# Patient Record
Sex: MALE | Race: OTHER | NOT HISPANIC OR LATINO
[De-identification: names, ages, dates, MRNs, and addresses within clinical notes are randomized per-mention and may not be internally consistent; named-entity substitution may affect disease eponyms.]

---

## 2021-11-01 ENCOUNTER — APPOINTMENT (OUTPATIENT)
Dept: NEPHROLOGY | Facility: CLINIC | Age: 66
End: 2021-11-01
Payer: MEDICAID

## 2021-11-01 VITALS — SYSTOLIC BLOOD PRESSURE: 130 MMHG | DIASTOLIC BLOOD PRESSURE: 80 MMHG | HEART RATE: 82 BPM

## 2021-11-01 VITALS — HEART RATE: 84 BPM

## 2021-11-01 PROCEDURE — 99205 OFFICE O/P NEW HI 60 MIN: CPT

## 2021-11-01 NOTE — HISTORY OF PRESENT ILLNESS
[FreeTextEntry1] : The patient is a 67 year old male presenting for initial evaluation of renal insufficiency s/p hospitalization for CAD August 2021.\par \par Chief Complaint: Pt was hospitalized in August 2021 in [place], [place] for acute chest pain for which he had catheterization. He was then sent to [facility], where it was noted that his bladder had been punctured and he was leaking copious blood and urine, which was treated with prolonged nephrostomy. His course at [facility] was most notable for sepsis with septic shock, his presentation with acute MI, requirement for intubation, and approx one week of hemodialysis. He was then transferred to [facility] and was discharged last Thursday. He reports he has a small left kidney stone and a large one on the right. His wife reports he had a fever last night of almost 102, and his wife reports that his urine grew both e.coli and Citrobacter. He is here now for second opinion regarding next steps in therapy. \par \par Pt has discussed above with Dr. Corral, who advised him to have nephrostomy replaced with internal ureteral stent and then removed. He states that he would not approach either stone until full stabilization no earlier than February. He is tentatively scheduled for a procedure with Dr. Corral tomorrow, and had an appt at the end of the week to see a cardiologist.\par \par Past Medical History:\par - denies h/o HTN, DMII\par - kidney stones since 15 years ago, which was treated with allopurinol\par - CAD\par - sleep apnea\par - AKD\par \par Past Surgical History/Hospitalizations:\par - prostate surgery\par - kidney stone removal\par - cardiac catheterization and angioplasty with stents\par \par Family History:\par - no relevant FMH\par \par Social History:\par - jeweler\par - , 11 children\par - never smoker, no Etoh use\par - no recent travels\par \par Labs \par - 10/19/21: Fe 21, iron sat 16%, glucose 137, RBC 3.02, HGB 9.0, HCT 28.7%, creatinine 1.13, eGFR >60\par - 10/16/21: glucose 104, Cl 109, creatinine 1.16\par Current Medications: ASA 81mg QD, ferrous sulfate 325mg BID, Mg 64 QD, melatonin 3mg QPM< tamsulosin Hcl 0.4mg BID, atorvastatin 80mg QD, clopidorel 75mg QD, furosemide 40mg QD, metoprolol 25mg QD, KCl 10meq QD, finasteride 5mg QD. allopurinol 100mg QD, Vitamin D 50,000u QIW\par \par Allergies: NKDA

## 2021-11-01 NOTE — ASSESSMENT
[FreeTextEntry1] : Plan:\par 1) BP acceptable today in office.\par 2) Recent acute MI evaluated with angiography and treated with angioplasty and stents. Complications in cluded bladder perforation and requirement for prolonged bladder healing with a right nephrostomy in place. Dr. Corral planning to place internal ureteral stent and remove nephrostomy tomorrow, but I have advised pt that he need ot have a cardiologist see him this afternoon in order to ensure that he is stable and the necessary precautions will be employed for the procedure. Dr. James was contacted to discuss pt. Will refer pt to Dr. James to have full cardiac workup and coordinate further treatment with Dr. Corral. Pt will see Dr. James today.\par 3) Long term health will require an approach to reducing his morbid obesity.\par 4) Will hope to evaluate stone issues after acute problems had resolved.\par \par No changes to medications. \par \par Patient will return in December for a follow-up appointment.

## 2021-11-01 NOTE — ADDENDUM
[FreeTextEntry1] : All medical record entries made by the Scribe were at my, OMER Das's direction and personally dictated by me on 11/01/2021. I have received the chart and agree that the record accurately reflects my personal performance of the history, physical exam, assessment, and plan. I have also personally directed, reviewed, and agreed with the chart.\par

## 2021-11-01 NOTE — PHYSICAL EXAM
[General Appearance - Alert] : alert [General Appearance - In No Acute Distress] : in no acute distress [Sclera] : the sclera and conjunctiva were normal [PERRL With Normal Accommodation] : pupils were equal in size, round, and reactive to light [Extraocular Movements] : extraocular movements were intact [Outer Ear] : the ears and nose were normal in appearance [Hearing Threshold Finger Rub Not [place]] : hearing was normal [Examination Of The Oral Cavity] : the lips and gums were normal [Neck Appearance] : the appearance of the neck was normal [Neck Cervical Mass (___cm)] : no neck mass was observed [Jugular Venous Distention Increased] : there was no jugular-venous distention [Thyroid Diffuse Enlargement] : the thyroid was not enlarged [Thyroid Nodule] : there were no palpable thyroid nodules [Auscultation Breath Sounds / Voice Sounds] : lungs were clear to auscultation bilaterally [Heart Rate And Rhythm] : heart rate was normal and rhythm regular [Heart Sounds] : normal S1 and S2 [Heart Sounds Gallop] : no gallops [Murmurs] : no murmurs [Heart Sounds Pericardial Friction Rub] : no pericardial rub [Edema] : there was no peripheral edema [Bowel Sounds] : normal bowel sounds [Abdomen Soft] : soft [Abdomen Tenderness] : non-tender [Abdomen Mass (___ Cm)] : no abdominal mass palpated [No CVA Tenderness] : no ~M costovertebral angle tenderness [No Spinal Tenderness] : no spinal tenderness [Abnormal Walk] : normal gait [Involuntary Movements] : no involuntary movements were seen [Musculoskeletal - Swelling] : no joint swelling seen [Motor Tone] : muscle strength and tone were normal [Skin Color & Pigmentation] : normal skin color and pigmentation [Skin Turgor] : normal skin turgor [] : no rash [No Focal Deficits] : no focal deficits [Oriented To Time, Place, And Person] : oriented to person, place, and time [Impaired Insight] : insight and judgment were intact [Affect] : the affect was normal

## 2021-11-01 NOTE — END OF VISIT
[Time Spent: ___ minutes] : I have spent [unfilled] minutes of time on the encounter. [FreeTextEntry3] : Documented by Love Batista acting as a scribe for OMER Das on 11/01/2021.\par

## 2021-12-01 ENCOUNTER — LABORATORY RESULT (OUTPATIENT)
Age: 66
End: 2021-12-01

## 2021-12-01 ENCOUNTER — APPOINTMENT (OUTPATIENT)
Dept: NEPHROLOGY | Facility: CLINIC | Age: 66
End: 2021-12-01
Payer: MEDICAID

## 2021-12-01 VITALS — TEMPERATURE: 97.6 F | OXYGEN SATURATION: 98 % | HEART RATE: 82 BPM | WEIGHT: 247 LBS

## 2021-12-01 VITALS — HEART RATE: 72 BPM | SYSTOLIC BLOOD PRESSURE: 135 MMHG | DIASTOLIC BLOOD PRESSURE: 80 MMHG

## 2021-12-01 VITALS — DIASTOLIC BLOOD PRESSURE: 81 MMHG | HEART RATE: 71 BPM | SYSTOLIC BLOOD PRESSURE: 136 MMHG

## 2021-12-01 PROCEDURE — 99214 OFFICE O/P EST MOD 30 MIN: CPT | Mod: 25

## 2021-12-01 NOTE — ADDENDUM
[FreeTextEntry1] : All medical record entries made by the Scribe were at my, OMER Das's direction and personally dictated by me on 12/01/2021. I have received the chart and agree that the record accurately reflects my personal performance of the history, physical exam, assessment, and plan. I have also personally directed, reviewed, and agreed with the chart.\par

## 2021-12-01 NOTE — HISTORY OF PRESENT ILLNESS
[FreeTextEntry1] : The patient is a 67 year old male presenting for follow-up evaluation of renal insufficiency with h/o kidney stones, CAD (s/p cardiac catheterization and angioplasty with stents), sleep apnea, and AKD, and h/o past COVID infection.\par \par The patient is feeling generally well today but reports bilateral pedal burning pain. He reports no pain with ambulation but reports pain at rest when standing. Pt reports difficulty continuing after walking 1-2 blocks, which he attributes to the current lack of arches in his feet. He denies any associated back pain. He has been anemic since three months ago and reports he previously received iron injections weekly at [facility]. He is relieved after removal of his nephrostomy and is scheduled for another procedure with Dr. Corral in February. The pt reports two episodes of visible hematuria this past week, without pain. PT has not yet received COVID-19 vaccine.\par - Reviewed most recent labs in detail with the patient today. \par \par Current Medications: ASA 81mg QD, ferrous sulfate 325mg BID, melatonin 3mg QPM, tamsulosin Hcl 0.4mg BID, atorvastatin 80mg QD, Clopidol 75mg QD, furosemide 20mg QD, metoprolol 25mg BID, KCl 10meq QD, finasteride 5mg QD, allopurinol 100mg QD, Vitamin D 50,000u QIW\par - off Mg 64 QD

## 2021-12-01 NOTE — ASSESSMENT
[FreeTextEntry1] : Plan:\par 1) BP acceptable today in office.\par 2) Pain / numbness in his foot: Referred to podiatrist Dr. Villalobos for further evaluation.\par 3) Obesity: Advised patient to limit caloric intake and increase exercise regimen, as tolerated and weather permitting, with goal of gradual reduction in weight. \par 4) H/o kidney stones: C/w allopurinol.\par 5) Low B12: Have administered 1,000 micrograms of B12 intramuscularly to the right deltoid without incident. \par 6) Anemia: Pt likely iron deficient. Will check iron saturation on labs today. May plan for pt to receive IV iron infusions.\par 7) Advised patient to receive a COVID vaccine as soon as possible, especially in view of persistent COVID risks, excepting if she has a history of severe allergy."\par 6) Referred to Dr. Perez for vascular evaluation of his legs.\par \par Changes to medications: Administered 1,000 units of B12.\par \par Labs were drawn and patient will return in 1 month for a follow-up appointment.

## 2021-12-01 NOTE — PHYSICAL EXAM
[General Appearance - Alert] : alert [General Appearance - In No Acute Distress] : in no acute distress [Sclera] : the sclera and conjunctiva were normal [PERRL With Normal Accommodation] : pupils were equal in size, round, and reactive to light [Extraocular Movements] : extraocular movements were intact [Outer Ear] : the ears and nose were normal in appearance [Hearing Threshold Finger Rub Not [place]] : hearing was normal [Neck Appearance] : the appearance of the neck was normal [Neck Cervical Mass (___cm)] : no neck mass was observed [Jugular Venous Distention Increased] : there was no jugular-venous distention [Thyroid Diffuse Enlargement] : the thyroid was not enlarged [Thyroid Nodule] : there were no palpable thyroid nodules [Auscultation Breath Sounds / Voice Sounds] : lungs were clear to auscultation bilaterally [Heart Rate And Rhythm] : heart rate was normal and rhythm regular [Heart Sounds] : normal S1 and S2 [Heart Sounds Gallop] : no gallops [Murmurs] : no murmurs [Heart Sounds Pericardial Friction Rub] : no pericardial rub [Bowel Sounds] : normal bowel sounds [Abdomen Soft] : soft [Abdomen Tenderness] : non-tender [Abdomen Mass (___ Cm)] : no abdominal mass palpated [No CVA Tenderness] : no ~M costovertebral angle tenderness [No Spinal Tenderness] : no spinal tenderness [Abnormal Walk] : normal gait [Involuntary Movements] : no involuntary movements were seen [Musculoskeletal - Swelling] : no joint swelling seen [Motor Tone] : muscle strength and tone were normal [Skin Color & Pigmentation] : normal skin color and pigmentation [Skin Turgor] : normal skin turgor [] : no rash [No Focal Deficits] : no focal deficits [Oriented To Time, Place, And Person] : oriented to person, place, and time [Impaired Insight] : insight and judgment were intact [Affect] : the affect was normal [FreeTextEntry1] : 1-2+ pitting bilateral LE edema

## 2021-12-02 ENCOUNTER — APPOINTMENT (OUTPATIENT)
Dept: NEPHROLOGY | Facility: CLINIC | Age: 66
End: 2021-12-02

## 2021-12-02 LAB
24R-OH-CALCIDIOL SERPL-MCNC: 42.3 PG/ML
25(OH)D3 SERPL-MCNC: 22.7 NG/ML
ALBUMIN SERPL ELPH-MCNC: 4.6 G/DL
ALDOSTERONE SERUM: 8.2 NG/DL
ALP BLD-CCNC: 82 U/L
ALT SERPL-CCNC: 12 U/L
ANION GAP SERPL CALC-SCNC: 14 MMOL/L
APPEARANCE: ABNORMAL
AST SERPL-CCNC: 14 U/L
BACTERIA: ABNORMAL
BASOPHILS # BLD AUTO: 0.03 K/UL
BASOPHILS NFR BLD AUTO: 0.5 %
BILIRUB SERPL-MCNC: 0.3 MG/DL
BILIRUBIN URINE: NEGATIVE
BLOOD URINE: ABNORMAL
BUN SERPL-MCNC: 17 MG/DL
C3 SERPL-MCNC: 136 MG/DL
C4 SERPL-MCNC: 29 MG/DL
CALCIUM SERPL-MCNC: 9.6 MG/DL
CALCIUM SERPL-MCNC: 9.6 MG/DL
CHLORIDE SERPL-SCNC: 105 MMOL/L
CHOLEST SERPL-MCNC: 117 MG/DL
CO2 SERPL-SCNC: 22 MMOL/L
COLOR: ABNORMAL
COVID-19 NUCLEOCAPSID  GAM ANTIBODY INTERPRETATION: POSITIVE
COVID-19 SPIKE DOMAIN ANTIBODY INTERPRETATION: POSITIVE
CREAT SERPL-MCNC: 1.34 MG/DL
CRP SERPL-MCNC: <3 MG/L
EOSINOPHIL # BLD AUTO: 0.22 K/UL
EOSINOPHIL NFR BLD AUTO: 3.4 %
ERYTHROCYTE [SEDIMENTATION RATE] IN BLOOD BY WESTERGREN METHOD: 27 MM/HR
ESTIMATED AVERAGE GLUCOSE: 103 MG/DL
FERRITIN SERPL-MCNC: 146 NG/ML
FOLATE SERPL-MCNC: >20 NG/ML
GLUCOSE QUALITATIVE U: NEGATIVE
GLUCOSE SERPL-MCNC: 100 MG/DL
HBA1C MFR BLD HPLC: 5.2 %
HBV SURFACE AB SER QL: NONREACTIVE
HBV SURFACE AG SER QL: NONREACTIVE
HCT VFR BLD CALC: 35.5 %
HCV AB SER QL: NONREACTIVE
HCV S/CO RATIO: 0.18 S/CO
HCYS SERPL-MCNC: 14.7 UMOL/L
HDLC SERPL-MCNC: 41 MG/DL
HGB BLD-MCNC: 11.1 G/DL
HYALINE CASTS: 0 /LPF
IMM GRANULOCYTES NFR BLD AUTO: 0.3 %
IRON SATN MFR SERPL: 23 %
IRON SERPL-MCNC: 69 UG/DL
KETONES URINE: NEGATIVE
LDLC SERPL CALC-MCNC: 42 MG/DL
LEUKOCYTE ESTERASE URINE: ABNORMAL
LYMPHOCYTES # BLD AUTO: 1.63 K/UL
LYMPHOCYTES NFR BLD AUTO: 25.1 %
MAGNESIUM SERPL-MCNC: 2.1 MG/DL
MAN DIFF?: NORMAL
MCHC RBC-ENTMCNC: 29 PG
MCHC RBC-ENTMCNC: 31.3 GM/DL
MCV RBC AUTO: 92.7 FL
MICROSCOPIC-UA: NORMAL
MONOCYTES # BLD AUTO: 0.41 K/UL
MONOCYTES NFR BLD AUTO: 6.3 %
NEUTROPHILS # BLD AUTO: 4.19 K/UL
NEUTROPHILS NFR BLD AUTO: 64.4 %
NITRITE URINE: POSITIVE
NONHDLC SERPL-MCNC: 75 MG/DL
PARATHYROID HORMONE INTACT: 45 PG/ML
PH URINE: 8.5
PHOSPHATE SERPL-MCNC: 4.1 MG/DL
PLATELET # BLD AUTO: 162 K/UL
POTASSIUM SERPL-SCNC: 4.1 MMOL/L
PROT SERPL-MCNC: 7.4 G/DL
PROTEIN URINE: ABNORMAL
RBC # BLD: 3.83 M/UL
RBC # FLD: 14 %
RED BLOOD CELLS URINE: 18 /HPF
RENIN PLASMA: 11.7 PG/ML
RHEUMATOID FACT SER QL: <10 IU/ML
SARS-COV-2 AB SERPL IA-ACNC: >250 U/ML
SARS-COV-2 AB SERPL QL IA: 52 INDEX
SODIUM SERPL-SCNC: 142 MMOL/L
SPECIFIC GRAVITY URINE: 1.01
SQUAMOUS EPITHELIAL CELLS: 1 /HPF
T3FREE SERPL-MCNC: 3.61 PG/ML
T3RU NFR SERPL: 1.1 TBI
T4 FREE SERPL-MCNC: 1 NG/DL
T4 SERPL-MCNC: 5.6 UG/DL
THYROGLOB AB SERPL-ACNC: 39.4 IU/ML
THYROPEROXIDASE AB SERPL IA-ACNC: 24.6 IU/ML
TIBC SERPL-MCNC: 299 UG/DL
TRIGL SERPL-MCNC: 165 MG/DL
TSH SERPL-ACNC: 4.73 UIU/ML
UIBC SERPL-MCNC: 230 UG/DL
URATE SERPL-MCNC: 6.9 MG/DL
UROBILINOGEN URINE: NORMAL
VIT B12 SERPL-MCNC: >2000 PG/ML
WBC # FLD AUTO: 6.5 K/UL
WHITE BLOOD CELLS URINE: 351 /HPF

## 2021-12-03 LAB
ANA SER IF-ACNC: NEGATIVE
CREAT SPEC-SCNC: 81 MG/DL
CREAT/PROT UR: 0.8 RATIO
PROT UR-MCNC: 64 MG/DL

## 2021-12-05 LAB
ALBUMIN MFR SERPL ELPH: 55.6 %
ALBUMIN SERPL-MCNC: 4.1 G/DL
ALBUMIN/GLOB SERPL: 1.2 RATIO
ALP BONE SERPL-MCNC: 10.1 UG/L
ALPHA1 GLOB MFR SERPL ELPH: 4.1 %
ALPHA1 GLOB SERPL ELPH-MCNC: 0.3 G/DL
ALPHA2 GLOB MFR SERPL ELPH: 10.3 %
ALPHA2 GLOB SERPL ELPH-MCNC: 0.8 G/DL
B-GLOBULIN MFR SERPL ELPH: 10.2 %
B-GLOBULIN SERPL ELPH-MCNC: 0.8 G/DL
BACTERIA UR CULT: ABNORMAL
DEPRECATED KAPPA LC FREE/LAMBDA SER: 1.14 RATIO
GAMMA GLOB FLD ELPH-MCNC: 1.5 G/DL
GAMMA GLOB MFR SERPL ELPH: 19.8 %
IGA SER QL IEP: 91 MG/DL
IGG SER QL IEP: 1525 MG/DL
IGM SER QL IEP: 125 MG/DL
INTERPRETATION SERPL IEP-IMP: NORMAL
KAPPA LC CSF-MCNC: 2.2 MG/DL
KAPPA LC SERPL-MCNC: 2.5 MG/DL
M PROTEIN SPEC IFE-MCNC: NORMAL
MPO AB + PR3 PNL SER: NORMAL
PROT SERPL-MCNC: 7.4 G/DL
PROT SERPL-MCNC: 7.4 G/DL

## 2021-12-06 LAB — COLLAGEN CTX SERPL-MCNC: 824 PG/ML

## 2021-12-08 ENCOUNTER — APPOINTMENT (OUTPATIENT)
Dept: NEPHROLOGY | Facility: CLINIC | Age: 66
End: 2021-12-08
Payer: MEDICAID

## 2021-12-08 DIAGNOSIS — N39.0 URINARY TRACT INFECTION, SITE NOT SPECIFIED: ICD-10-CM

## 2021-12-08 DIAGNOSIS — M79.671 PAIN IN RIGHT FOOT: ICD-10-CM

## 2021-12-08 DIAGNOSIS — M79.672 PAIN IN RIGHT FOOT: ICD-10-CM

## 2021-12-08 PROCEDURE — 99443: CPT

## 2021-12-08 NOTE — ADDENDUM
[FreeTextEntry1] : All medical record entries made by the Scribe were at my, OMER Das's direction and personally dictated by me on 12/08/2021. I have received the chart and agree that the record accurately reflects my personal performance of the history, physical exam, assessment, and plan. I have also personally directed, reviewed, and agreed with the chart.\par

## 2021-12-08 NOTE — ASSESSMENT
[FreeTextEntry1] : Plan:\par 1) Visible hematuria: Pt will be seeing Dr. Corral 01/04/21. Pt has proteus mirabilis-esbl but is asymptomatic and will therefore not be treated for it at this time. Pt to call if he becomes symptomatic.\par 2) Pain / numbness in his feet: Rx for gabapentin 100mg QPM. May need to see a neurologist for further evaluation.\par 3) Obesity: Advised patient to limit caloric intake and increase exercise regimen, as tolerated and weather permitting, with goal of gradual reduction in weight. \par 4) H/o kidney stones: C/w allopurinol.\par 5) Referred to Dr. Maribell Bell for cardiology evaluation.\par \par Changes to medication: Will start gabapentin 100mg QD.\par \par Plan to reconvene with patient in 2-4 weeks via telemedicine or in office. \par

## 2021-12-08 NOTE — HISTORY OF PRESENT ILLNESS
[Home] : at home, [unfilled] , at the time of the visit. [Medical Office: ([facility])___] : at the medical office located in  [Verbal consent obtained from patient] : the patient, [unfilled] [FreeTextEntry1] : The patient is a 67 year old male presenting for follow-up evaluation of renal insufficiency with h/o kidney stones, CAD (s/p cardiac catheterization and angioplasty with stents), sleep apnea, and AKD, and h/o past COVID infection.\par \par The ft is feeling generally well. He is attending physical therapy. He continues to c/o visible hematuria. The pt has appointment with Dr Corral 01/04/22. He denies any LUTS from proteus mirabilis, and denies fevers/chills. Pt continues to c/o bilateral foot pain.\par - Reviewed most recent labs in detail with the patient today. \par \par Labs 12/01/21: hgb 11.1, Vit D 25-oh 22.7, TSH 4.73, glucose 100, creatinine 1.34, eGFR 41, sed rate 27, protein/creat ratio 0.8, positive COVID spike domain antibody with nucleocapsid\par - he has proteus mirabilis-esbl \par \par Current Medications: ASA 81mg QD, ferrous sulfate 325mg BID, melatonin 3mg QPM, tamsulosin Hcl 0.4mg BID, atorvastatin 80mg QD, Clopidol 75mg QD, furosemide 20mg QD, metoprolol 25mg BID, KCl 10meq QD, finasteride 5mg QD, allopurinol 100mg QD, Vitamin D 3000 QD (just started)\par

## 2021-12-10 LAB — METHYLMALONATE SERPL-SCNC: 436 NMOL/L

## 2022-03-01 ENCOUNTER — APPOINTMENT (OUTPATIENT)
Dept: NEPHROLOGY | Facility: CLINIC | Age: 67
End: 2022-03-01

## 2022-05-05 ENCOUNTER — APPOINTMENT (OUTPATIENT)
Dept: NEPHROLOGY | Facility: CLINIC | Age: 67
End: 2022-05-05
Payer: MEDICARE

## 2022-05-05 PROCEDURE — 99214 OFFICE O/P EST MOD 30 MIN: CPT | Mod: 95

## 2022-05-05 PROCEDURE — 99215 OFFICE O/P EST HI 40 MIN: CPT | Mod: 95

## 2022-05-05 NOTE — PHYSICAL EXAM
[General Appearance - Alert] : alert [General Appearance - In No Acute Distress] : in no acute distress [Sclera] : the sclera and conjunctiva were normal [Outer Ear] : the ears and nose were normal in appearance [Neck Appearance] : the appearance of the neck was normal [Oriented To Time, Place, And Person] : oriented to person, place, and time [Impaired Insight] : insight and judgment were intact [Affect] : the affect was normal

## 2022-05-05 NOTE — HISTORY OF PRESENT ILLNESS
[Home] : at home, [unfilled] , at the time of the visit. [Medical Office: ([facility])___] : at the medical office located in  [Verbal consent obtained from patient] : the patient, [unfilled] [Spouse] : spouse [FreeTextEntry1] : The patient is a 68 year old male presenting for follow-up evaluation of renal insufficiency with h/o kidney stones, CAD (s/p cardiac catheterization and angioplasty with stents), sleep apnea, and AKD, and h/o past COVID infection. Last seen December 2021 via telehealth.\par \par The patient is feeling generally well today. He presents with his wife. He had labs drawn recently 4/25 and 4/27, which were reviewed. He reports he received first Procrit shot yesterday from hematologist and has received three IV iron infusions. He c/o some constipation but denies issues voiding. Patient's wife reports that patient has been weak and is fatigued with walking, with elevated pulse . He denies any palpitations. He has had recent evaluation and echo with Dr. James. Wife reports that labs yesterday showed WBC of >14k though denies fever.\par \par Labs \par - 04/20/22: Fe 18, iron sat 11%, ferritin 1135, WBC 11.2, RBC 2.65, HGB 7.6, HCT 22.8%, PTH <16\par - 04/13/22: RBC 2.7, HGB 7.9, HCT 23.9%, eGFR 59, HDL 23, Fe 21, iron sat 12%, TSH 4.9, T3 66.3, U WBC 6-10, moderate calcium oxalate crystals, Vit B12 936, CO2 18, creatinine 1.3, HgbA1c 5.7%\par \par 04/29/22 US Male Pelvis: The urinary bladder is distended to a prevoid volume of 53mL with no postvoid residual volume. No obvious gross adenopathy appreciated.\par \par 04/29/22 US Abdomen:\par Impression: Complex fluid collection seen abutting the right kidney with some mass-effect on the cortex with questionable stents in the region of the hilum correlate with recent intervention history. Further work-up advised with CT abdomen and pelvis with and without intravenous contrast.\par \par Current Medications: ASA 81mg QD, ferrous sulfate 325mg BID, melatonin 3mg QPM, tamsulosin Hcl 0.4mg BID, atorvastatin 80mg QD, Clopidol 75mg QD, furosemide 20mg QD, metoprolol 25mg BID, KCl 10meq QD, finasteride 5mg QD, allopurinol 100mg QD, Vitamin D 3000 QD, gabapentin 100mg QD\par  fair minus

## 2022-05-05 NOTE — ADDENDUM
[FreeTextEntry1] : All medical record entries made by the Scribe were at my, OMER Das's direction and personally dictated by me on 05/05/2022. I have received the chart and agree that the record accurately reflects my personal performance of the history, physical exam, assessment, and plan. I have also personally directed, reviewed, and agreed with the chart.

## 2022-05-05 NOTE — END OF VISIT
[Time Spent: ___ minutes] : I have spent [unfilled] minutes of time on the encounter. [FreeTextEntry3] : Documented by Love Batista acting as a scribe for OMER Das on 05/05/2022.

## 2022-05-05 NOTE — ASSESSMENT
[FreeTextEntry1] : Plan:\par 1) Reviewed most recent labs with patient. \par 2) Patient will f/u with Dr. Corral and have reevaluation for his reported WBC of >14k and paranephric collections seen on CT. Discussed concerns about use of contrast in imaging (prior eGFR ~40) and discussed use of sonography, gallium scans, and direct fluid analysis by interventional radiology if those options are thought to be helpful by his current caregivers. \par 3) Patient to continue receiving Procrit shots with hematologist. Explained to patient and wife that results are not immediate and he is to continue treatment.\par 4) H/o kidney stones: C/w allopurinol.\par 5) Constipation: Advised to take Miralax.\par 6) Patient to request recent cardiac records from Dr. James and from Dr. Cheema at [facility] to be sent to my office for review.\par \par Plan to reconvene with patient in office in June for a follow-up appointment.

## 2022-05-10 ENCOUNTER — LABORATORY RESULT (OUTPATIENT)
Age: 67
End: 2022-05-10

## 2022-05-10 ENCOUNTER — APPOINTMENT (OUTPATIENT)
Dept: NEPHROLOGY | Facility: CLINIC | Age: 67
End: 2022-05-10
Payer: MEDICARE

## 2022-05-10 VITALS — HEART RATE: 94 BPM | DIASTOLIC BLOOD PRESSURE: 79 MMHG | SYSTOLIC BLOOD PRESSURE: 137 MMHG

## 2022-05-10 VITALS — DIASTOLIC BLOOD PRESSURE: 80 MMHG | HEART RATE: 100 BPM | SYSTOLIC BLOOD PRESSURE: 138 MMHG

## 2022-05-10 VITALS — WEIGHT: 218.26 LBS

## 2022-05-10 VITALS — HEART RATE: 113 BPM | SYSTOLIC BLOOD PRESSURE: 133 MMHG | DIASTOLIC BLOOD PRESSURE: 76 MMHG

## 2022-05-10 DIAGNOSIS — Z86.2 PERSONAL HISTORY OF DISEASES OF THE BLOOD AND BLOOD-FORMING ORGANS AND CERTAIN DISORDERS INVOLVING THE IMMUNE MECHANISM: ICD-10-CM

## 2022-05-10 PROCEDURE — 93000 ELECTROCARDIOGRAM COMPLETE: CPT

## 2022-05-10 PROCEDURE — 99214 OFFICE O/P EST MOD 30 MIN: CPT | Mod: 25

## 2022-05-10 NOTE — ADDENDUM
[FreeTextEntry1] : All medical record entries made by the Scribe were at my, Dr. Dc's, discretion and personally dictated by me on 05/10/2022. I have reviewed the chart and agree that the record accurately reflects my personal performance of the history, physical exam, assessment and plan. I have also personally directed, reviewed and agreed to the chart.

## 2022-05-10 NOTE — ASSESSMENT
[FreeTextEntry1] : Plan:\par 1) Low blood count: Patient to continue receiving iron infusions and Procrit shots with hematologist. Explained to patient and wife that results are not immediate.\par 2) H/o kidney stones: C/w allopurinol.\par 3) EKG done today demonstrates normal sinus rhythm, low voltage QRS cannot rule out septal infarct, age undetermined. \par 4) BLE edema,  tachycardia while standing, EKG findings: Will order CT of chest/abdomen/pelvis and informed patient he can do this in [place]. Advised patient to obtain a copy of the disc. \par 5) Fluid overload: Pt has BLE edema on furosemide 20mg. Will assess relevant labs on blood work today. Will order doppler US to check for bilateral DVT. Tentative plan to treat with torsemide to treat more aggressive diuresis. \par \par Plan to reconvene with patient in office 4-6 weeks for a follow-up appointment.

## 2022-05-10 NOTE — PHYSICAL EXAM
[General Appearance - Alert] : alert [General Appearance - In No Acute Distress] : in no acute distress [Sclera] : the sclera and conjunctiva were normal [PERRL With Normal Accommodation] : pupils were equal in size, round, and reactive to light [Extraocular Movements] : extraocular movements were intact [Outer Ear] : the ears and nose were normal in appearance [Examination Of The Oral Cavity] : the lips and gums were normal [Neck Appearance] : the appearance of the neck was normal [Neck Cervical Mass (___cm)] : no neck mass was observed [Jugular Venous Distention Increased] : there was no jugular-venous distention [Thyroid Diffuse Enlargement] : the thyroid was not enlarged [Thyroid Nodule] : there were no palpable thyroid nodules [Auscultation Breath Sounds / Voice Sounds] : lungs were clear to auscultation bilaterally [Heart Rate And Rhythm] : heart rate was normal and rhythm regular [Heart Sounds] : normal S1 and S2 [Heart Sounds Gallop] : no gallops [Murmurs] : no murmurs [Heart Sounds Pericardial Friction Rub] : no pericardial rub [No CVA Tenderness] : no ~M costovertebral angle tenderness [No Spinal Tenderness] : no spinal tenderness [Skin Color & Pigmentation] : normal skin color and pigmentation [Skin Turgor] : normal skin turgor [] : no rash [No Focal Deficits] : no focal deficits [Oriented To Time, Place, And Person] : oriented to person, place, and time [Impaired Insight] : insight and judgment were intact [Affect] : the affect was normal [FreeTextEntry1] : uses cane

## 2022-05-10 NOTE — HISTORY OF PRESENT ILLNESS
[FreeTextEntry1] : The patient is a 68 year old male presenting for follow-up evaluation of renal insufficiency with h/o kidney stones, CAD (s/p cardiac catheterization and angioplasty with stents), sleep apnea (uses CPAP), and AKD, and h/o past COVID infection 2020. \par \par The patient is feeling generally well today. Reports he recently saw Dr. Corral who evaluated paranephric collections and did urine tests. Patient reports his constipation has improved, on MiraLax. He has lost over 20 lbs in the last 5 months and his family reports he no longer drinks soda or eats chips. He does physical therapy 3x a week. Patient has been getting iron infusions and Procrit injections with a hematologist. Patient endorses foot pain and states he saw a neurologist and was told he has nerve damage. He denies h/o HTN, DM.\par \par Labs \par - 04/20/22: Fe 18, iron sat 11%, ferritin 1135, WBC 11.2, RBC 2.65, HGB 7.6, HCT 22.8%, PTH <16\par - 04/13/22: RBC 2.7, HGB 7.9, HCT 23.9%, eGFR 59, HDL 23, Fe 21, iron sat 12%, TSH 4.9, T3 66.3, U WBC 6-10, moderate calcium oxalate crystals, Vit B12 936, CO2 18, creatinine 1.3, HgbA1c 5.7%\par \par Current Medications: ASA 81mg QD, ferrous sulfate 325mg BID, melatonin 3mg QPM, tamsulosin Hcl 0.4mg BID, atorvastatin 80mg QD, Clopidol 75mg QD, furosemide 20mg QD, metoprolol 25mg BID, KCl 10meq QD, finasteride 5mg QD, allopurinol 100mg QD, Vitamin D 3000 QD, gabapentin 100mg QD, Miralax\par \par \par

## 2022-05-15 LAB
24R-OH-CALCIDIOL SERPL-MCNC: 37.6 PG/ML
25(OH)D3 SERPL-MCNC: 35.4 NG/ML
ALBUMIN SERPL ELPH-MCNC: 3.1 G/DL
ALDOSTERONE SERUM: 6.7 NG/DL
ALP BLD-CCNC: 85 U/L
ALT SERPL-CCNC: 8 U/L
ANA PAT FLD IF-IMP: ABNORMAL
ANA SER IF-ACNC: ABNORMAL
ANION GAP SERPL CALC-SCNC: 14 MMOL/L
APPEARANCE: ABNORMAL
AST SERPL-CCNC: 11 U/L
BACTERIA: ABNORMAL
BASOPHILS # BLD AUTO: 0.04 K/UL
BASOPHILS NFR BLD AUTO: 0.3 %
BILIRUB SERPL-MCNC: 0.4 MG/DL
BILIRUBIN URINE: NEGATIVE
BLOOD URINE: ABNORMAL
BUN SERPL-MCNC: 15 MG/DL
C3 SERPL-MCNC: 149 MG/DL
C4 SERPL-MCNC: 31 MG/DL
CALCIUM SERPL-MCNC: 9.2 MG/DL
CALCIUM SERPL-MCNC: 9.2 MG/DL
CH50 SERPL-MCNC: 54 U/ML
CHLORIDE SERPL-SCNC: 100 MMOL/L
CHOLEST SERPL-MCNC: 128 MG/DL
CO2 SERPL-SCNC: 23 MMOL/L
COLOR: YELLOW
CREAT SERPL-MCNC: 1.34 MG/DL
CREAT SPEC-SCNC: 140 MG/DL
CREAT/PROT UR: 0.5 RATIO
CRP SERPL-MCNC: 104 MG/L
CYSTATIN C SERPL-MCNC: 1.84 MG/L
DEPRECATED D DIMER PPP IA-ACNC: 978 NG/ML DDU
DEPRECATED KAPPA LC FREE/LAMBDA SER: 0.91 RATIO
DIRECT COOMBS: ABNORMAL
EGFR: 44 ML/MIN/1.73M2
EOSINOPHIL # BLD AUTO: 0.03 K/UL
EOSINOPHIL NFR BLD AUTO: 0.2 %
EPO SERPL-MCNC: 26.5 MIU/ML
ERYTHROCYTE [SEDIMENTATION RATE] IN BLOOD BY WESTERGREN METHOD: 52 MM/HR
ESTIMATED AVERAGE GLUCOSE: 108 MG/DL
FERRITIN SERPL-MCNC: 1390 NG/ML
FOLATE SERPL-MCNC: 9.9 NG/ML
FREE KAPPA URINE: 450.7 MG/L
FREE KAPPA/LAMDA RATIO: 5.13 RATIO
FREE LAMDA URINE: 87.84 MG/L
GFR/BSA.PRED SERPLBLD CYS-BASED-ARV: 32 ML/MIN/1.73M2
GLUCOSE QUALITATIVE U: NEGATIVE
GLUCOSE SERPL-MCNC: 98 MG/DL
HBA1C MFR BLD HPLC: 5.4 %
HBV SURFACE AB SER QL: NONREACTIVE
HBV SURFACE AG SER QL: NONREACTIVE
HCT VFR BLD CALC: 25.9 %
HCV AB SER QL: NONREACTIVE
HCV S/CO RATIO: 0.32 S/CO
HCYS SERPL-MCNC: 15.6 UMOL/L
HDLC SERPL-MCNC: 31 MG/DL
HGB A MFR BLD: 97.3 %
HGB A2 MFR BLD: 2.7 %
HGB BLD-MCNC: 7.7 G/DL
HGB FRACT BLD-IMP: NORMAL
HYALINE CASTS: 0 /LPF
IMM GRANULOCYTES NFR BLD AUTO: 0.9 %
IRON SATN MFR SERPL: 10 %
IRON SERPL-MCNC: 15 UG/DL
KAPPA LC CSF-MCNC: 13.07 MG/DL
KAPPA LC SERPL-MCNC: 11.85 MG/DL
KETONES URINE: NEGATIVE
LDLC SERPL CALC-MCNC: 75 MG/DL
LEUKOCYTE ESTERASE URINE: ABNORMAL
LYMPHOCYTES # BLD AUTO: 1.19 K/UL
LYMPHOCYTES NFR BLD AUTO: 9.2 %
MAGNESIUM SERPL-MCNC: 2.1 MG/DL
MAN DIFF?: NORMAL
MCHC RBC-ENTMCNC: 28.3 PG
MCHC RBC-ENTMCNC: 29.7 GM/DL
MCV RBC AUTO: 95.2 FL
MICROSCOPIC-UA: NORMAL
MONOCYTES # BLD AUTO: 0.61 K/UL
MONOCYTES NFR BLD AUTO: 4.7 %
NEUTROPHILS # BLD AUTO: 10.95 K/UL
NEUTROPHILS NFR BLD AUTO: 84.7 %
NITRITE URINE: NEGATIVE
NONHDLC SERPL-MCNC: 97 MG/DL
NT-PROBNP SERPL-MCNC: 830 PG/ML
PARATHYROID HORMONE INTACT: 11 PG/ML
PH URINE: 6.5
PHOSPHATE SERPL-MCNC: 4 MG/DL
PLATELET # BLD AUTO: 315 K/UL
POTASSIUM SERPL-SCNC: 4 MMOL/L
PROCALCITONIN SERPL-MCNC: 0.28 NG/ML
PROT SERPL-MCNC: 7.5 G/DL
PROT UR-MCNC: 64 MG/DL
PROTEIN URINE: ABNORMAL
RBC # BLD: 2.72 M/UL
RBC # BLD: 2.72 M/UL
RBC # FLD: 17.7 %
RED BLOOD CELLS URINE: 14 /HPF
RENIN PLASMA: 14.6 PG/ML
RETICS # AUTO: 2.5 %
RETICS AGGREG/RBC NFR: 68.1 K/UL
RHEUMATOID FACT SER QL: 16 IU/ML
SODIUM SERPL-SCNC: 136 MMOL/L
SPECIFIC GRAVITY URINE: 1.02
SQUAMOUS EPITHELIAL CELLS: 2 /HPF
T3FREE SERPL-MCNC: 1.99 PG/ML
T3RU NFR SERPL: 0.9 TBI
T4 FREE SERPL-MCNC: 1.1 NG/DL
T4 SERPL-MCNC: 5.6 UG/DL
THYROGLOB AB SERPL-ACNC: 21 IU/ML
THYROPEROXIDASE AB SERPL IA-ACNC: 31.2 IU/ML
TIBC SERPL-MCNC: 143 UG/DL
TM INTERPRETATION: NORMAL
TRIGL SERPL-MCNC: 111 MG/DL
TSH SERPL-ACNC: 4.44 UIU/ML
UIBC SERPL-MCNC: 128 UG/DL
URATE SERPL-MCNC: 5.3 MG/DL
UROBILINOGEN URINE: NORMAL
VIT B12 SERPL-MCNC: 1048 PG/ML
WBC # FLD AUTO: 12.94 K/UL
WHITE BLOOD CELLS URINE: 48 /HPF

## 2022-05-17 LAB
ALBUMIN MFR SERPL ELPH: 37.3 %
ALBUMIN SERPL-MCNC: 2.8 G/DL
ALBUMIN/GLOB SERPL: 0.6 RATIO
ALBUPE: 15.3 %
ALP BONE SERPL-MCNC: 11.6 UG/L
ALPHA1 GLOB MFR SERPL ELPH: 8.7 %
ALPHA1 GLOB SERPL ELPH-MCNC: 0.7 G/DL
ALPHA1UPE: 33.1 %
ALPHA2 GLOB MFR SERPL ELPH: 12.9 %
ALPHA2 GLOB SERPL ELPH-MCNC: 1 G/DL
ALPHA2UPE: 18.1 %
B-GLOBULIN MFR SERPL ELPH: 10.7 %
B-GLOBULIN SERPL ELPH-MCNC: 0.8 G/DL
BETAUPE: 17 %
COLLAGEN CTX SERPL-MCNC: 918 PG/ML
DEPRECATED KAPPA LC FREE/LAMBDA SER: 0.91 RATIO
GAMMA GLOB FLD ELPH-MCNC: 2.3 G/DL
GAMMA GLOB MFR SERPL ELPH: 30.4 %
GAMMAUPE: 16.5 %
IGA 24H UR QL IFE: NORMAL
IGA SER QL IEP: 170 MG/DL
IGG SER QL IEP: 2409 MG/DL
IGM SER QL IEP: 156 MG/DL
INTERPRETATION SERPL IEP-IMP: NORMAL
KAPPA LC 24H UR QL: NORMAL
KAPPA LC CSF-MCNC: 13.07 MG/DL
KAPPA LC SERPL-MCNC: 11.85 MG/DL
M PROTEIN MFR SERPL ELPH: NORMAL
M PROTEIN SPEC IFE-MCNC: NORMAL
MONOCLON BAND OBS SERPL: NORMAL
PROT PATTERN 24H UR ELPH-IMP: NORMAL
PROT SERPL-MCNC: 7.5 G/DL
PROT SERPL-MCNC: 7.5 G/DL
PROT UR-MCNC: 62 MG/DL
PROT UR-MCNC: 62 MG/DL

## 2022-05-22 LAB — METHYLMALONATE SERPL-SCNC: 269 NMOL/L

## 2022-06-12 LAB
C1Q IMMUNE COMPLEX: 94.8 UG EQ/ML
C1Q IMMUNE COMPLEX: >100 UG EQ/ML
C3D IMMUNE COMPLEXES: 19.7 UG EQ/ML

## 2022-07-11 ENCOUNTER — APPOINTMENT (OUTPATIENT)
Dept: NEPHROLOGY | Facility: CLINIC | Age: 67
End: 2022-07-11

## 2022-08-15 ENCOUNTER — LABORATORY RESULT (OUTPATIENT)
Age: 67
End: 2022-08-15

## 2022-08-15 ENCOUNTER — APPOINTMENT (OUTPATIENT)
Dept: NEPHROLOGY | Facility: CLINIC | Age: 67
End: 2022-08-15

## 2022-08-15 PROCEDURE — 36415 COLL VENOUS BLD VENIPUNCTURE: CPT

## 2022-08-15 PROCEDURE — 99214 OFFICE O/P EST MOD 30 MIN: CPT | Mod: 25

## 2022-08-16 LAB
25(OH)D3 SERPL-MCNC: 24.7 NG/ML
ALBUMIN SERPL ELPH-MCNC: 3.4 G/DL
ALP BLD-CCNC: 86 U/L
ALT SERPL-CCNC: 7 U/L
ANION GAP SERPL CALC-SCNC: 14 MMOL/L
APTT BLD: 37.1 SEC
AST SERPL-CCNC: 9 U/L
BASOPHILS # BLD AUTO: 0.04 K/UL
BASOPHILS NFR BLD AUTO: 0.5 %
BILIRUB SERPL-MCNC: 0.3 MG/DL
BUN SERPL-MCNC: 14 MG/DL
CALCIUM SERPL-MCNC: 9.1 MG/DL
CALCIUM SERPL-MCNC: 9.1 MG/DL
CHLORIDE SERPL-SCNC: 105 MMOL/L
CHOLEST SERPL-MCNC: 145 MG/DL
CO2 SERPL-SCNC: 19 MMOL/L
CORTIS SERPL-MCNC: 8 UG/DL
CREAT SERPL-MCNC: 1.21 MG/DL
CRP SERPL-MCNC: 12 MG/L
DEPRECATED D DIMER PPP IA-ACNC: 297 NG/ML DDU
EGFR: 66 ML/MIN/1.73M2
EOSINOPHIL # BLD AUTO: 0.08 K/UL
EOSINOPHIL NFR BLD AUTO: 1 %
ERYTHROCYTE [SEDIMENTATION RATE] IN BLOOD BY WESTERGREN METHOD: 64 MM/HR
ESTIMATED AVERAGE GLUCOSE: 97 MG/DL
FERRITIN SERPL-MCNC: 590 NG/ML
FOLATE SERPL-MCNC: 10.7 NG/ML
GLUCOSE SERPL-MCNC: 101 MG/DL
HBA1C MFR BLD HPLC: 5 %
HBV SURFACE AB SER QL: NONREACTIVE
HBV SURFACE AG SER QL: NONREACTIVE
HCT VFR BLD CALC: 37.4 %
HCV AB SER QL: NONREACTIVE
HCV S/CO RATIO: 0.16 S/CO
HCYS SERPL-MCNC: 17 UMOL/L
HDLC SERPL-MCNC: 50 MG/DL
HGB BLD-MCNC: 11.1 G/DL
IMM GRANULOCYTES NFR BLD AUTO: 0.5 %
INR PPP: 1.1 RATIO
IRON SATN MFR SERPL: 32 %
IRON SERPL-MCNC: 58 UG/DL
LDLC SERPL CALC-MCNC: 82 MG/DL
LYMPHOCYTES # BLD AUTO: 1.58 K/UL
LYMPHOCYTES NFR BLD AUTO: 18.9 %
MAGNESIUM SERPL-MCNC: 2 MG/DL
MAN DIFF?: NORMAL
MCHC RBC-ENTMCNC: 28.6 PG
MCHC RBC-ENTMCNC: 29.7 GM/DL
MCV RBC AUTO: 96.4 FL
MONOCYTES # BLD AUTO: 0.37 K/UL
MONOCYTES NFR BLD AUTO: 4.4 %
NEUTROPHILS # BLD AUTO: 6.24 K/UL
NEUTROPHILS NFR BLD AUTO: 74.7 %
NONHDLC SERPL-MCNC: 95 MG/DL
NT-PROBNP SERPL-MCNC: 327 PG/ML
PARATHYROID HORMONE INTACT: 30 PG/ML
PHOSPHATE SERPL-MCNC: 3.4 MG/DL
PLATELET # BLD AUTO: 293 K/UL
POTASSIUM SERPL-SCNC: 4.3 MMOL/L
PROCALCITONIN SERPL-MCNC: 0.09 NG/ML
PROT SERPL-MCNC: 7.5 G/DL
PT BLD: 12.8 SEC
RBC # BLD: 3.88 M/UL
RBC # FLD: 15.5 %
RHEUMATOID FACT SER QL: <10 IU/ML
SODIUM SERPL-SCNC: 138 MMOL/L
T3FREE SERPL-MCNC: 2.56 PG/ML
T3RU NFR SERPL: 0.9 TBI
T4 FREE SERPL-MCNC: 1.2 NG/DL
T4 SERPL-MCNC: 5.8 UG/DL
TIBC SERPL-MCNC: 180 UG/DL
TRIGL SERPL-MCNC: 69 MG/DL
TSH SERPL-ACNC: 4.08 UIU/ML
UIBC SERPL-MCNC: 122 UG/DL
URATE SERPL-MCNC: 6.2 MG/DL
VIT B12 SERPL-MCNC: 600 PG/ML
WBC # FLD AUTO: 8.35 K/UL

## 2022-08-22 ENCOUNTER — APPOINTMENT (OUTPATIENT)
Dept: NEPHROLOGY | Facility: CLINIC | Age: 67
End: 2022-08-22
Payer: MEDICARE

## 2022-08-22 LAB
24R-OH-CALCIDIOL SERPL-MCNC: 14.7 PG/ML
ALP BONE SERPL-MCNC: 12.3 UG/L
ANA PAT FLD IF-IMP: ABNORMAL
ANA SER IF-ACNC: ABNORMAL
APPEARANCE: ABNORMAL
BACTERIA UR CULT: ABNORMAL
BACTERIA: ABNORMAL
BETA CAROTENE: 7 UG/DL
BILIRUBIN URINE: NEGATIVE
BLOOD URINE: NEGATIVE
C1Q IMMUNE COMPLEX: 24.4 UG EQ/ML
C3 SERPL-MCNC: 119 MG/DL
C4 SERPL-MCNC: 26 MG/DL
CELIAC DISEASE INTERPRETATION: NORMAL
CELIAC GENE PAIRS PRESENT: YES
CELIACPAN: NORMAL
COLLAGEN CTX SERPL-MCNC: 1157 PG/ML
COLOR: NORMAL
DEPRECATED KAPPA LC FREE/LAMBDA SER: 1.22 RATIO
DQ ALPHA 1: NORMAL
DQ BETA 1: NORMAL
GLUCOSE QUALITATIVE U: NEGATIVE
HYALINE CASTS: 0 /LPF
IGA 24H UR QL IFE: NORMAL
IMMUNOGLOBULIN A (IGA): 163 MG/DL
KAPPA LC CSF-MCNC: 6.08 MG/DL
KAPPA LC SERPL-MCNC: 7.43 MG/DL
KETONES URINE: NEGATIVE
LEUKOCYTE ESTERASE URINE: ABNORMAL
MICROSCOPIC-UA: NORMAL
NITRITE URINE: POSITIVE
PH URINE: 6
PROTEIN URINE: NEGATIVE
RED BLOOD CELLS URINE: 1 /HPF
SPECIFIC GRAVITY URINE: 1.01
SQUAMOUS EPITHELIAL CELLS: 0 /HPF
THYROGLOB AB SERPL-ACNC: 23.7 IU/ML
THYROPEROXIDASE AB SERPL IA-ACNC: 51 IU/ML
UROBILINOGEN URINE: NORMAL
VIT A SERPL-MCNC: 25.8 UG/DL
VIT B1 SERPL-MCNC: 119.5 NMOL/L
VIT B2 SERPL-MCNC: 201 UG/L
VIT B6 SERPL-MCNC: 3.4 UG/L
WHITE BLOOD CELLS URINE: 67 /HPF

## 2022-08-22 PROCEDURE — 99214 OFFICE O/P EST MOD 30 MIN: CPT | Mod: 95

## 2022-08-29 LAB
ALBUMIN MFR SERPL ELPH: 43.2 %
ALBUMIN SERPL-MCNC: 3.2 G/DL
ALBUMIN/GLOB SERPL: 0.7 RATIO
ALPHA1 GLOB MFR SERPL ELPH: 6 %
ALPHA1 GLOB SERPL ELPH-MCNC: 0.4 G/DL
ALPHA2 GLOB MFR SERPL ELPH: 10.3 %
ALPHA2 GLOB SERPL ELPH-MCNC: 0.8 G/DL
ANNOTATION COMMENT IMP: NORMAL
B-GLOBULIN MFR SERPL ELPH: 9.5 %
B-GLOBULIN SERPL ELPH-MCNC: 0.7 G/DL
DEPRECATED KAPPA LC FREE/LAMBDA SER: 1.22 RATIO
GAMMA GLOB FLD ELPH-MCNC: 2.3 G/DL
GAMMA GLOB MFR SERPL ELPH: 31 %
HLA-DQ2: POSITIVE
HLA-DQ8 QL: NEGATIVE
IGA SER QL IEP: 172 MG/DL
IGG SER QL IEP: 2381 MG/DL
IGM SER QL IEP: 232 MG/DL
INTERPRETATION SERPL IEP-IMP: NORMAL
KAPPA LC CSF-MCNC: 6.08 MG/DL
KAPPA LC SERPL-MCNC: 7.43 MG/DL
M PROTEIN MFR SERPL ELPH: NORMAL
M PROTEIN SPEC IFE-MCNC: NORMAL
MENADIONE SERPL-MCNC: 0.57 NG/ML
MONOCLON BAND OBS SERPL: NORMAL
NICOTINAMIDE: 42.2 NG/ML
NICOTINIC ACID: <5 NG/ML
PROT SERPL-MCNC: 7.5 G/DL
PROT SERPL-MCNC: 7.5 G/DL
REF LAB TEST METHOD: NORMAL
VIT C SERPL-MCNC: 0.3 MG/DL

## 2022-11-28 ENCOUNTER — APPOINTMENT (OUTPATIENT)
Dept: NEPHROLOGY | Facility: CLINIC | Age: 67
End: 2022-11-28

## 2022-11-28 NOTE — HISTORY OF PRESENT ILLNESS
[FreeTextEntry1] : The patient is a 67 year male being seen for a follow up. He has a previous history of renal insufficiency with h/o kidney stones, CAD (s/p cardiac catheterization and angioplasty with stents), sleep apnea (uses CPAP), and AKD, and h/o past COVID infection 2020.  Last seen August 2022.\par \par Labs \par - 04/20/22: Fe 18, iron sat 11%, ferritin 1135, WBC 11.2, RBC 2.65, HGB 7.6, HCT 22.8%, PTH <16\par - 04/13/22: RBC 2.7, HGB 7.9, HCT 23.9%, eGFR 59, HDL 23, Fe 21, iron sat 12%, TSH 4.9, T3 66.3, U WBC 6-10, moderate calcium oxalate crystals, Vit B12 936, CO2 18, creatinine 1.3, HgbA1c 5.7%\par \par Current Medications: ASA 81mg QD, ferrous sulfate 325mg BID, melatonin 3mg QPM, tamsulosin Hcl 0.4mg BID, atorvastatin 80mg QD, Clopidol 75mg QD, furosemide 20mg QD, metoprolol 25mg BID, KCl 10meq QD, finasteride 5mg QD, allopurinol 100mg QD, Vitamin D 3000 QD, gabapentin 100mg QD, Miralax\par \par \par

## 2022-12-20 ENCOUNTER — LABORATORY RESULT (OUTPATIENT)
Age: 67
End: 2022-12-20

## 2022-12-20 ENCOUNTER — APPOINTMENT (OUTPATIENT)
Dept: NEPHROLOGY | Facility: CLINIC | Age: 67
End: 2022-12-20

## 2022-12-20 VITALS — WEIGHT: 204 LBS

## 2022-12-20 DIAGNOSIS — Z23 ENCOUNTER FOR IMMUNIZATION: ICD-10-CM

## 2022-12-20 DIAGNOSIS — R63.4 ABNORMAL WEIGHT LOSS: ICD-10-CM

## 2022-12-20 PROCEDURE — 90662 IIV NO PRSV INCREASED AG IM: CPT

## 2022-12-20 PROCEDURE — 99214 OFFICE O/P EST MOD 30 MIN: CPT | Mod: 25

## 2022-12-20 PROCEDURE — G0008: CPT

## 2023-01-01 LAB
ALBUMIN MFR SERPL ELPH: 55.9 %
ALBUMIN SERPL ELPH-MCNC: 4.6 G/DL
ALBUMIN SERPL-MCNC: 4.3 G/DL
ALBUMIN/GLOB SERPL: 1.3 RATIO
ALP BLD-CCNC: 109 U/L
ALPHA1 GLOB MFR SERPL ELPH: 4 %
ALPHA1 GLOB SERPL ELPH-MCNC: 0.3 G/DL
ALPHA2 GLOB MFR SERPL ELPH: 9.3 %
ALPHA2 GLOB SERPL ELPH-MCNC: 0.7 G/DL
ALT SERPL-CCNC: 11 U/L
ANA SER IF-ACNC: NEGATIVE
ANION GAP SERPL CALC-SCNC: 13 MMOL/L
APPEARANCE: ABNORMAL
AST SERPL-CCNC: 15 U/L
B-GLOBULIN MFR SERPL ELPH: 9 %
B-GLOBULIN SERPL ELPH-MCNC: 0.7 G/DL
BACTERIA: ABNORMAL
BASOPHILS # BLD AUTO: 0.03 K/UL
BASOPHILS NFR BLD AUTO: 0.5 %
BILIRUB SERPL-MCNC: 0.4 MG/DL
BILIRUBIN URINE: NEGATIVE
BLOOD URINE: NEGATIVE
BUN SERPL-MCNC: 17 MG/DL
C3 SERPL-MCNC: 120 MG/DL
C4 SERPL-MCNC: 30 MG/DL
CALCIUM SERPL-MCNC: 9.7 MG/DL
CHLORIDE SERPL-SCNC: 103 MMOL/L
CHOLEST SERPL-MCNC: 126 MG/DL
CO2 SERPL-SCNC: 22 MMOL/L
COLOR: NORMAL
COVID-19 NUCLEOCAPSID  GAM ANTIBODY INTERPRETATION: POSITIVE
COVID-19 SPIKE DOMAIN ANTIBODY INTERPRETATION: POSITIVE
CREAT SERPL-MCNC: 1.19 MG/DL
CREAT SPEC-SCNC: 62 MG/DL
CREAT/PROT UR: 0.2 RATIO
CRP SERPL-MCNC: 4 MG/L
CYSTATIN C SERPL-MCNC: 1.53 MG/L
DEPRECATED KAPPA LC FREE/LAMBDA SER: 1.36 RATIO
EGFR: 68 ML/MIN/1.73M2
EOSINOPHIL # BLD AUTO: 0.16 K/UL
EOSINOPHIL NFR BLD AUTO: 2.5 %
ERYTHROCYTE [SEDIMENTATION RATE] IN BLOOD BY WESTERGREN METHOD: 25 MM/HR
ESTIMATED AVERAGE GLUCOSE: 100 MG/DL
FERRITIN SERPL-MCNC: 387 NG/ML
FOLATE SERPL-MCNC: 19.5 NG/ML
GAMMA GLOB FLD ELPH-MCNC: 1.7 G/DL
GAMMA GLOB MFR SERPL ELPH: 21.8 %
GFR/BSA.PRED SERPLBLD CYS-BASED-ARV: 43 ML/MIN/1.73M2
GLUCOSE QUALITATIVE U: NEGATIVE
GLUCOSE SERPL-MCNC: 84 MG/DL
HBA1C MFR BLD HPLC: 5.1 %
HBV SURFACE AB SER QL: NONREACTIVE
HBV SURFACE AG SER QL: NONREACTIVE
HCT VFR BLD CALC: 39.5 %
HCV AB SER QL: NONREACTIVE
HCV S/CO RATIO: 0.12 S/CO
HCYS SERPL-MCNC: 13.8 UMOL/L
HDLC SERPL-MCNC: 49 MG/DL
HGB BLD-MCNC: 12.7 G/DL
HYALINE CASTS: 0 /LPF
IGA SER QL IEP: 111 MG/DL
IGG SER QL IEP: 1810 MG/DL
IGM SER QL IEP: 152 MG/DL
IMM GRANULOCYTES NFR BLD AUTO: 0.2 %
INTERPRETATION SERPL IEP-IMP: NORMAL
IRON SATN MFR SERPL: 26 %
IRON SERPL-MCNC: 72 UG/DL
KAPPA LC CSF-MCNC: 2.39 MG/DL
KAPPA LC SERPL-MCNC: 3.24 MG/DL
KETONES URINE: NEGATIVE
LDLC SERPL CALC-MCNC: 60 MG/DL
LEUKOCYTE ESTERASE URINE: ABNORMAL
LYMPHOCYTES # BLD AUTO: 1.5 K/UL
LYMPHOCYTES NFR BLD AUTO: 23.7 %
M PROTEIN MFR SERPL ELPH: 0 %
M PROTEIN SPEC IFE-MCNC: NORMAL
MAGNESIUM SERPL-MCNC: 2.1 MG/DL
MAN DIFF?: NORMAL
MCHC RBC-ENTMCNC: 30.5 PG
MCHC RBC-ENTMCNC: 32.2 GM/DL
MCV RBC AUTO: 95 FL
METHYLMALONATE SERPL-SCNC: 299 NMOL/L
MICROSCOPIC-UA: NORMAL
MONOCLON BAND OBS SERPL: 0 G/DL
MONOCYTES # BLD AUTO: 0.44 K/UL
MONOCYTES NFR BLD AUTO: 6.9 %
NEUTROPHILS # BLD AUTO: 4.2 K/UL
NEUTROPHILS NFR BLD AUTO: 66.2 %
NITRITE URINE: POSITIVE
NONHDLC SERPL-MCNC: 77 MG/DL
PH URINE: 6.5
PHOSPHATE SERPL-MCNC: 4 MG/DL
PLATELET # BLD AUTO: 155 K/UL
POTASSIUM SERPL-SCNC: 4.5 MMOL/L
PROT SERPL-MCNC: 7.7 G/DL
PROT UR-MCNC: 14 MG/DL
PROTEIN URINE: NORMAL
PSA FREE FLD-MCNC: 19 %
PSA FREE SERPL-MCNC: 0.32 NG/ML
PSA SERPL-MCNC: 1.64 NG/ML
RBC # BLD: 4.16 M/UL
RBC # FLD: 13.7 %
RED BLOOD CELLS URINE: 2 /HPF
RHEUMATOID FACT SER QL: <10 IU/ML
SARS-COV-2 AB SERPL IA-ACNC: >250 U/ML
SARS-COV-2 AB SERPL QL IA: 88.7 INDEX
SODIUM SERPL-SCNC: 138 MMOL/L
SPECIFIC GRAVITY URINE: 1.01
SQUAMOUS EPITHELIAL CELLS: 0 /HPF
T3FREE SERPL-MCNC: 2.84 PG/ML
T3RU NFR SERPL: 1 TBI
T4 FREE SERPL-MCNC: 1.1 NG/DL
T4 SERPL-MCNC: 6.2 UG/DL
TESTOST FREE SERPL-MCNC: 6.5 PG/ML
TESTOST SERPL-MCNC: 697 NG/DL
THYROGLOB AB SERPL-ACNC: 33.1 IU/ML
THYROPEROXIDASE AB SERPL IA-ACNC: 67.9 IU/ML
TIBC SERPL-MCNC: 282 UG/DL
TRIGL SERPL-MCNC: 87 MG/DL
TSH SERPL-ACNC: 3.38 UIU/ML
UIBC SERPL-MCNC: 210 UG/DL
URATE SERPL-MCNC: 4.1 MG/DL
UROBILINOGEN URINE: NORMAL
VIT B12 SERPL-MCNC: 426 PG/ML
WBC # FLD AUTO: 6.34 K/UL
WHITE BLOOD CELLS URINE: 127 /HPF

## 2023-04-03 ENCOUNTER — LABORATORY RESULT (OUTPATIENT)
Age: 68
End: 2023-04-03

## 2023-04-03 ENCOUNTER — APPOINTMENT (OUTPATIENT)
Dept: NEPHROLOGY | Facility: CLINIC | Age: 68
End: 2023-04-03
Payer: MEDICARE

## 2023-04-03 VITALS — WEIGHT: 216.05 LBS

## 2023-04-03 PROCEDURE — 99214 OFFICE O/P EST MOD 30 MIN: CPT | Mod: 25

## 2023-04-03 PROCEDURE — 36415 COLL VENOUS BLD VENIPUNCTURE: CPT

## 2023-04-03 RX ORDER — TADALAFIL 20 MG/1
20 TABLET ORAL
Qty: 8 | Refills: 6 | Status: ACTIVE | COMMUNITY
Start: 2022-12-20 | End: 1900-01-01

## 2023-04-05 LAB
24R-OH-CALCIDIOL SERPL-MCNC: 37.2 PG/ML
25(OH)D3 SERPL-MCNC: 23.9 NG/ML
ALBUMIN SERPL ELPH-MCNC: 4.7 G/DL
ALP BLD-CCNC: 95 U/L
ALT SERPL-CCNC: 9 U/L
ANA SER IF-ACNC: NEGATIVE
ANION GAP SERPL CALC-SCNC: 13 MMOL/L
APPEARANCE: ABNORMAL
AST SERPL-CCNC: 15 U/L
BACTERIA: ABNORMAL
BASOPHILS # BLD AUTO: 0.03 K/UL
BASOPHILS NFR BLD AUTO: 0.4 %
BILIRUB SERPL-MCNC: 0.5 MG/DL
BILIRUBIN URINE: NEGATIVE
BLOOD URINE: ABNORMAL
BUN SERPL-MCNC: 16 MG/DL
C3 SERPL-MCNC: 102 MG/DL
C4 SERPL-MCNC: 23 MG/DL
CALCIUM SERPL-MCNC: 9.4 MG/DL
CALCIUM SERPL-MCNC: 9.4 MG/DL
CHLORIDE SERPL-SCNC: 107 MMOL/L
CHOLEST SERPL-MCNC: 130 MG/DL
CO2 SERPL-SCNC: 20 MMOL/L
COLOR: YELLOW
CREAT SERPL-MCNC: 1.19 MG/DL
CREAT SPEC-SCNC: 99 MG/DL
CREAT/PROT UR: 0.2 RATIO
CYSTATIN C SERPL-MCNC: 1.45 MG/L
EGFR: 67 ML/MIN/1.73M2
EOSINOPHIL # BLD AUTO: 0.17 K/UL
EOSINOPHIL NFR BLD AUTO: 2.4 %
ERYTHROCYTE [SEDIMENTATION RATE] IN BLOOD BY WESTERGREN METHOD: 11 MM/HR
ESTIMATED AVERAGE GLUCOSE: 103 MG/DL
FERRITIN SERPL-MCNC: 281 NG/ML
FOLATE SERPL-MCNC: 12.4 NG/ML
GFR/BSA.PRED SERPLBLD CYS-BASED-ARV: 46 ML/MIN/1.73M2
GLUCOSE QUALITATIVE U: NEGATIVE
GLUCOSE SERPL-MCNC: 87 MG/DL
HBA1C MFR BLD HPLC: 5.2 %
HBV SURFACE AB SER QL: NONREACTIVE
HBV SURFACE AG SER QL: NONREACTIVE
HCT VFR BLD CALC: 39 %
HCV AB SER QL: NONREACTIVE
HCV S/CO RATIO: 0.15 S/CO
HCYS SERPL-MCNC: 12.7 UMOL/L
HDLC SERPL-MCNC: 50 MG/DL
HGB BLD-MCNC: 13 G/DL
HYALINE CASTS: 0 /LPF
IMM GRANULOCYTES NFR BLD AUTO: 0.3 %
IRON SATN MFR SERPL: 26 %
IRON SERPL-MCNC: 72 UG/DL
KETONES URINE: NEGATIVE
LDLC SERPL CALC-MCNC: 57 MG/DL
LEUKOCYTE ESTERASE URINE: ABNORMAL
LYMPHOCYTES # BLD AUTO: 1.89 K/UL
LYMPHOCYTES NFR BLD AUTO: 26.8 %
MAGNESIUM SERPL-MCNC: 2 MG/DL
MAN DIFF?: NORMAL
MCHC RBC-ENTMCNC: 30.3 PG
MCHC RBC-ENTMCNC: 33.3 GM/DL
MCV RBC AUTO: 90.9 FL
MICROSCOPIC-UA: NORMAL
MONOCYTES # BLD AUTO: 0.38 K/UL
MONOCYTES NFR BLD AUTO: 5.4 %
NEUTROPHILS # BLD AUTO: 4.57 K/UL
NEUTROPHILS NFR BLD AUTO: 64.7 %
NITRITE URINE: POSITIVE
NONHDLC SERPL-MCNC: 80 MG/DL
PARATHYROID HORMONE INTACT: 61 PG/ML
PH URINE: 7
PHOSPHATE SERPL-MCNC: 4 MG/DL
PLATELET # BLD AUTO: 129 K/UL
POTASSIUM SERPL-SCNC: 4.2 MMOL/L
PROT SERPL-MCNC: 7.2 G/DL
PROT UR-MCNC: 16 MG/DL
PROTEIN URINE: NORMAL
RBC # BLD: 4.29 M/UL
RBC # FLD: 13.9 %
RED BLOOD CELLS URINE: 44 /HPF
RHEUMATOID FACT SER QL: <10 IU/ML
SODIUM SERPL-SCNC: 141 MMOL/L
SPECIFIC GRAVITY URINE: 1.02
SQUAMOUS EPITHELIAL CELLS: 2 /HPF
T3FREE SERPL-MCNC: 3.01 PG/ML
T3RU NFR SERPL: 1 TBI
T4 FREE SERPL-MCNC: 1.2 NG/DL
T4 SERPL-MCNC: 6.4 UG/DL
THYROGLOB AB SERPL-ACNC: 30.6 IU/ML
THYROPEROXIDASE AB SERPL IA-ACNC: 49.7 IU/ML
TIBC SERPL-MCNC: 278 UG/DL
TRIGL SERPL-MCNC: 114 MG/DL
TSH SERPL-ACNC: 2.9 UIU/ML
UIBC SERPL-MCNC: 205 UG/DL
URATE SERPL-MCNC: 4.3 MG/DL
UROBILINOGEN URINE: NORMAL
VIT B12 SERPL-MCNC: 356 PG/ML
WBC # FLD AUTO: 7.06 K/UL
WHITE BLOOD CELLS URINE: 34 /HPF

## 2023-04-16 LAB
ALBUMIN MFR SERPL ELPH: 59.5 %
ALBUMIN SERPL-MCNC: 4.3 G/DL
ALBUMIN/GLOB SERPL: 1.5 RATIO
ALP BONE SERPL-MCNC: 19.3 UG/L
ALPHA1 GLOB MFR SERPL ELPH: 3.5 %
ALPHA1 GLOB SERPL ELPH-MCNC: 0.3 G/DL
ALPHA2 GLOB MFR SERPL ELPH: 8.7 %
ALPHA2 GLOB SERPL ELPH-MCNC: 0.6 G/DL
B-GLOBULIN MFR SERPL ELPH: 8.6 %
B-GLOBULIN SERPL ELPH-MCNC: 0.6 G/DL
BACTERIA UR CULT: ABNORMAL
COLLAGEN CTX SERPL-MCNC: 889 PG/ML
DEPRECATED KAPPA LC FREE/LAMBDA SER: 1.28 RATIO
GAMMA GLOB FLD ELPH-MCNC: 1.4 G/DL
GAMMA GLOB MFR SERPL ELPH: 19.7 %
IGA SER QL IEP: 106 MG/DL
IGG SER QL IEP: 1519 MG/DL
IGM SER QL IEP: 137 MG/DL
INTERPRETATION SERPL IEP-IMP: NORMAL
KAPPA LC CSF-MCNC: 1.92 MG/DL
KAPPA LC SERPL-MCNC: 2.45 MG/DL
M PROTEIN SPEC IFE-MCNC: NORMAL
METHYLMALONATE SERPL-SCNC: 319 NMOL/L
PROT SERPL-MCNC: 7.2 G/DL
PROT SERPL-MCNC: 7.2 G/DL

## 2023-04-17 ENCOUNTER — APPOINTMENT (OUTPATIENT)
Dept: NEPHROLOGY | Facility: CLINIC | Age: 68
End: 2023-04-17
Payer: MEDICARE

## 2023-04-17 DIAGNOSIS — N15.1 RENAL AND PERINEPHRIC ABSCESS: ICD-10-CM

## 2023-04-17 PROCEDURE — 99214 OFFICE O/P EST MOD 30 MIN: CPT | Mod: 95

## 2023-04-27 ENCOUNTER — APPOINTMENT (OUTPATIENT)
Dept: INFECTIOUS DISEASE | Facility: CLINIC | Age: 68
End: 2023-04-27
Payer: MEDICARE

## 2023-04-27 ENCOUNTER — APPOINTMENT (OUTPATIENT)
Dept: NEPHROLOGY | Facility: CLINIC | Age: 68
End: 2023-04-27

## 2023-04-27 VITALS
HEART RATE: 61 BPM | TEMPERATURE: 97.2 F | SYSTOLIC BLOOD PRESSURE: 152 MMHG | OXYGEN SATURATION: 100 % | DIASTOLIC BLOOD PRESSURE: 84 MMHG | BODY MASS INDEX: 35.99 KG/M2 | WEIGHT: 216 LBS | HEIGHT: 65 IN

## 2023-04-27 PROCEDURE — 99205 OFFICE O/P NEW HI 60 MIN: CPT

## 2023-04-27 RX ORDER — TAMSULOSIN HYDROCHLORIDE 0.4 MG/1
0.4 CAPSULE ORAL
Refills: 0 | Status: ACTIVE | COMMUNITY

## 2023-04-27 RX ORDER — GABAPENTIN 100 MG/1
100 CAPSULE ORAL
Qty: 90 | Refills: 1 | Status: COMPLETED | COMMUNITY
Start: 2021-12-08 | End: 2023-04-27

## 2023-04-27 RX ORDER — CLOPIDOGREL 75 MG/1
75 TABLET, FILM COATED ORAL
Refills: 0 | Status: ACTIVE | COMMUNITY

## 2023-04-27 RX ORDER — METOPROLOL SUCCINATE 25 MG/1
25 TABLET, EXTENDED RELEASE ORAL
Refills: 0 | Status: ACTIVE | COMMUNITY

## 2023-04-27 RX ORDER — ASPIRIN 81 MG
81 TABLET, DELAYED RELEASE (ENTERIC COATED) ORAL
Refills: 0 | Status: ACTIVE | COMMUNITY

## 2023-04-27 RX ORDER — ATORVASTATIN CALCIUM 10 MG/1
10 TABLET, FILM COATED ORAL
Refills: 0 | Status: ACTIVE | COMMUNITY

## 2023-04-27 NOTE — ASSESSMENT
[FreeTextEntry1] : 67 yo male with CKD, nephrolithiasis presenting for ID evaluation for R renal cyst and L struvite stone. Given prior drainage/intervention of R renal abscess with interval resolution of urosepsis, possible R cystic lesion on CT 3/2023 reflects chronic finding rather than active renal abscess; persistent bacteruria i/s/o large L struvite stone. Likely needs eventual elective removal for source control (patient expresses reservation about proceeding with another stone removal procedure given complicated course following prior stone removal). Would defer to  re: sampling of R renal cystic lesion. Most recent ucx with susceptible E. coli. If/when urologic instrumentation anticipated, would base choice of trina-procedural antibiotic prophylaxis to most recent urine culture--if pre-procedure urine cx again grows E. coli with same phenotype, then cefazolin would be a reasonable choice for prophylaxis.\par \par d/w Dr. Dc\par \par rtc prn [Treatment Adherence] : treatment adherence [Risk Reduction] : risk reduction [Medical Care Issues] : medical care issues [Anticipatory Guidance] : anticipatory guidance

## 2023-04-27 NOTE — PHYSICAL EXAM
[General Appearance - Alert] : alert [General Appearance - In No Acute Distress] : in no acute distress [] : no respiratory distress [Respiration, Rhythm And Depth] : normal respiratory rhythm and effort [Exaggerated Use Of Accessory Muscles For Inspiration] : no accessory muscle use [Heart Rate And Rhythm] : heart rate was normal and rhythm regular [Deep Tendon Reflexes (DTR)] : deep tendon reflexes were 2+ and symmetric [Sensation] : the sensory exam was normal to light touch and pinprick [No Focal Deficits] : no focal deficits [Oriented To Time, Place, And Person] : oriented to person, place, and time [Affect] : the affect was normal

## 2023-04-27 NOTE — HISTORY OF PRESENT ILLNESS
[FreeTextEntry1] : 69 yo male with CKD, nephrolithiasis presenting for ID evaluation for R renal cyst and L struvite stone. \par He reports R ureteral stone ?obstructive removal with his urologist at [place] Dr. Corral 3/2022--course was c/b gross hematuria requiring intervention as well as urosepsis. Course c/b by development of R renal abscess s/p multiple drainage procedures (reports removal of 600cc pus from prior drainage catheter); abscess cx reportedly with growth of Proteus for which he completed 10d course of ertapenem. R renal catheter and R ureteral stent were eventually removed ~7/2022. Over the summer, he noted air in urine, since resolved.\par Presently, he denies hematuria or dysuria. Attributes more frequent urination in AM to coffee consumption. No f/c or flank pain. He had CT AP with PO contrast done 3/27/23 which showed ~7cm R upper renal pole cystic lesion (improved from prior CT 5/2022) as well as 1.1cm L staghorn calculus. \par UA 4/3 with hematuria and pyuria; ucx grew > 100K CFU/mL E. coli for which his urologist rx one week course of Augmentin.

## 2023-05-02 ENCOUNTER — APPOINTMENT (OUTPATIENT)
Dept: UROLOGY | Facility: CLINIC | Age: 68
End: 2023-05-02
Payer: MEDICARE

## 2023-05-02 VITALS
TEMPERATURE: 98 F | OXYGEN SATURATION: 98 % | BODY MASS INDEX: 35.99 KG/M2 | DIASTOLIC BLOOD PRESSURE: 72 MMHG | HEART RATE: 67 BPM | SYSTOLIC BLOOD PRESSURE: 131 MMHG | WEIGHT: 216 LBS | HEIGHT: 65 IN

## 2023-05-02 DIAGNOSIS — Z00.00 ENCOUNTER FOR GENERAL ADULT MEDICAL EXAMINATION W/OUT ABNORMAL FINDINGS: ICD-10-CM

## 2023-05-02 DIAGNOSIS — Z78.9 OTHER SPECIFIED HEALTH STATUS: ICD-10-CM

## 2023-05-02 DIAGNOSIS — R39.89 OTHER SYMPTOMS AND SIGNS INVOLVING THE GENITOURINARY SYSTEM: ICD-10-CM

## 2023-05-02 PROCEDURE — 99205 OFFICE O/P NEW HI 60 MIN: CPT

## 2023-05-02 RX ORDER — ATORVASTATIN CALCIUM 80 MG/1
TABLET, FILM COATED ORAL
Refills: 0 | Status: ACTIVE | COMMUNITY

## 2023-05-02 RX ORDER — ALFUZOSIN HYDROCHLORIDE 10 MG/1
10 TABLET, EXTENDED RELEASE ORAL
Refills: 0 | Status: ACTIVE | COMMUNITY

## 2023-05-02 RX ORDER — ALLOPURINOL 300 MG/1
300 TABLET ORAL
Refills: 0 | Status: ACTIVE | COMMUNITY

## 2023-05-02 RX ORDER — CLOPIDOGREL BISULFATE 300 MG/1
TABLET, FILM COATED ORAL
Refills: 0 | Status: ACTIVE | COMMUNITY

## 2023-05-02 NOTE — ASSESSMENT
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

## 2023-05-02 NOTE — HISTORY OF PRESENT ILLNESS
[FreeTextEntry1] : CC: Staghorn stone, perirenal abscess\par \par HPI: \par March of 2022 patient had surgery with Dr. Corarl, complicated by septic shock, blood transfusions.  Complicated by right renal abscess, s/p multiple drainage procedures, "600cc of pus", eventually right stent and PCN removed. \par \par They took out stones from the right, and stones still present on the left. \par \par 3/27/2023: CT with large staghorn on the lower pole of the left, 11mm.  Cystic lesion abutting the upper cortex of the right kidney with an air fluid level 7 x 3.8 cm in size.  There is air and fluid tracking along the retroperitoneuym and along the ureter with some pockets of fluid noted along the course. \par \par An aspect of this air around or near the right ureter is immediately adjacent to sigmoid\par \par Of note he had pneumaturia in August-October 2022 this was a few weeks or a couple of months \par \par No current LUTS other than frequency \par No fevers, chills, n/v or abominal pain\par No recurrence of the pneumaturia \par \par FAMHX: no urologic history \par SURGHX: right PCNL, "Some sort of prostate procedure 15 years ago"\par SOCIAL: Nonsmoker \par ROS: Negative 10 system \par

## 2023-05-10 LAB
APPEARANCE: CLEAR
BACTERIA UR CULT: ABNORMAL
BACTERIA: ABNORMAL /HPF
BILIRUBIN URINE: NEGATIVE
BLOOD URINE: ABNORMAL
CAST: 0 /LPF
COLOR: YELLOW
EPITHELIAL CELLS: 0 /HPF
GLUCOSE QUALITATIVE U: NEGATIVE MG/DL
KETONES URINE: NEGATIVE MG/DL
LEUKOCYTE ESTERASE URINE: ABNORMAL
MICROSCOPIC-UA: NORMAL
NITRITE URINE: POSITIVE
PH URINE: 7
PROTEIN URINE: NEGATIVE MG/DL
RED BLOOD CELLS URINE: 1 /HPF
SPECIFIC GRAVITY URINE: 1.01
UROBILINOGEN URINE: 0.2 MG/DL
WHITE BLOOD CELLS URINE: 58 /HPF

## 2023-05-10 RX ORDER — SULFAMETHOXAZOLE AND TRIMETHOPRIM 800; 160 MG/1; MG/1
800-160 TABLET ORAL TWICE DAILY
Qty: 14 | Refills: 0 | Status: ACTIVE | COMMUNITY
Start: 2023-05-10 | End: 1900-01-01

## 2023-05-12 ENCOUNTER — OUTPATIENT (OUTPATIENT)
Dept: OUTPATIENT SERVICES | Facility: HOSPITAL | Age: 68
LOS: 1 days | End: 2023-05-12
Payer: MEDICARE

## 2023-05-12 ENCOUNTER — APPOINTMENT (OUTPATIENT)
Dept: RADIOLOGY | Facility: HOSPITAL | Age: 68
End: 2023-05-12
Payer: MEDICARE

## 2023-05-12 PROCEDURE — 74270 X-RAY XM COLON 1CNTRST STD: CPT

## 2023-05-12 PROCEDURE — 74270 X-RAY XM COLON 1CNTRST STD: CPT | Mod: 26

## 2023-05-15 ENCOUNTER — NON-APPOINTMENT (OUTPATIENT)
Age: 68
End: 2023-05-15

## 2023-06-02 ENCOUNTER — APPOINTMENT (OUTPATIENT)
Dept: UROLOGY | Facility: CLINIC | Age: 68
End: 2023-06-02
Payer: MEDICARE

## 2023-06-02 DIAGNOSIS — N15.1 RENAL AND PERINEPHRIC ABSCESS: ICD-10-CM

## 2023-06-02 PROCEDURE — 99215 OFFICE O/P EST HI 40 MIN: CPT

## 2023-06-02 NOTE — ASSESSMENT
[FreeTextEntry1] : Assessment:  \par CORRY AGUILERA is a 68 year old male with complicated kidney stone history including a right PCNL for right struvite staghorn complicated by septic shock, renal abscess, and bleeding, requiring ICU care, blood transfusions, and drainage of abscess. Has a known 1.1 cm lower pole non-obstructing stone in the left kidney - stone is radiopaque on KUB suggesting calcium composition - on allopurinol from a previous doctor but no recollection of previous 24-hour urinalysis results. \par  \par I discussed the management of kidney stones with the patient ranging from surveillance with medical prevention to surgical intervention for his left-sided stone. Based on complex stone history, competing comorbidity, and potential stability of non-obstructing stones, we agree to proceed with surveillance. \par  \par  \par Plan:  \par -Follow-up in 6 months for RBUS \par -Litholink ordered - will contact patient with results\par -Continue with allopurinol for now - will consider adjustment based on Litholink results\par \par \par Neeraj Trinh MD\par Director, Endourology and [facility]\par [facility]\par  of Urology\par [facility] [facility] [employer] [facility]

## 2023-06-02 NOTE — HISTORY OF PRESENT ILLNESS
[FreeTextEntry1] : Name CORRY AGUILERA \par MRN [id number] \par  1955 \par ------------------------------------------------------------------------------------------------------------------------------------------- \par Date of First Visit: \par Referring Provider/PCP: Manuel Pelaez / Binh Dc\par ------------------------------------------------------------------------------------------------------------------------------------------- \par CC: nephrolithiasis, h/o septic shock and renal abscess\par  \par History of Present Illness: CORRY AGUILERA is a 68 year old make h/o CAD s/p PCI on Plavix and nephrolithiasis who presents for evaluation of kidney stones. He underwent right PCNL for staghorn stone with Dr. Corral in 2022, complicated by septic shock requiring ICU admission to [facility] and significant postoperative bleeding requiring blood transfusions. Surgery was also complicated by a 7 cm right renal abscess requiring multiple drainage procedures, including tube upsizing and instillations. Cx positive for Proteus. States he has ongoing LLE weakness from "nerve damage" following septic shock and prolonged care/rehab care. Has not had left sided stones treated but states they have been there for a while. CT from 2023 (ordered by Dr. Corral) noted a 1.1 cm left lower pole stone. Of note, patient has had persistently positive urine cultures, but has been asymptomatic - recently evaluated by Dr. Willett of ID. Patient has been on allopurinol, believe for kidney stone prevention.  Does not recall who initially prescribed it.  Does not recall previous 24-hour urinalysis results (we are unable to locate them in [facility] portal). Serum UA 4.3 4/3/23 on treatment; previously high-normal at 6.9 , also on treatment at that time.\par \par Transitioning care to [facility] including stone surveillance/prevention. \par \par Imaging: CT scan from 2023 (report only) can be found in [facility] PACS. Findings: Complex cystic lesion with air-fluid level abutting the upper pole of the right kidney is noted with some tracking pockets of fluid and air along the right ureter.  This appearance is improved form prior study. Left staghorn calculi in the lower pole of the let kidney measures 11mm.. \par  \par Previous urine cultures:  \par 2023: >100,000 CFU/ml Escherichia coli \par 2023: >100,000 CFU/ml Escherichia coli \par 2022: >100,000 CFU/ml Escherichia coli \par 2021: >100,000 CFU/ml Proteus mirabilis ESBL \par \par Kidney Stone History:  \par First-time stone former - No\par Concurrent asymptomatic stone(s) - Yes\par Previous stone surgeries - Yes\par Previous passed stones - Yes\par Comorbidities - obesity\par Previous metabolic evaluation - Yes - unknown results; currently on allopurinol\par

## 2023-08-28 ENCOUNTER — LABORATORY RESULT (OUTPATIENT)
Age: 68
End: 2023-08-28

## 2023-08-28 ENCOUNTER — APPOINTMENT (OUTPATIENT)
Dept: NEPHROLOGY | Facility: CLINIC | Age: 68
End: 2023-08-28
Payer: MEDICARE

## 2023-08-28 VITALS — BODY MASS INDEX: 37.79 KG/M2 | WEIGHT: 227.07 LBS

## 2023-08-28 PROCEDURE — 99214 OFFICE O/P EST MOD 30 MIN: CPT | Mod: 25

## 2023-08-28 PROCEDURE — 36415 COLL VENOUS BLD VENIPUNCTURE: CPT

## 2023-08-29 NOTE — ADDENDUM
[FreeTextEntry1] : BP/HR taken in different positions (sitting standing and lying down) and d/w pt Self-monitoring at home advised i.e. BP, FS, etc. Medications updated Recent lab results reviewed Diet/healthy lifestyle counselling New labs ordered. Follow up in 6-8 weeks.  Patient continuous to have presence of stone associated with infection (w/ recent review of problem by Dr Trinh) on June 2, 2023 His current exam is satisfactory. Medications reviewed and will not be changed. Will plan to forward to Dr Will and plan to see patient in 6-8 weeks

## 2023-08-29 NOTE — PHYSICAL EXAM
[General Appearance - Alert] : alert [General Appearance - In No Acute Distress] : in no acute distress [Auscultation Breath Sounds / Voice Sounds] : lungs were clear to auscultation bilaterally [Heart Rate And Rhythm] : heart rate was normal and rhythm regular [Heart Sounds] : normal S1 and S2 [Heart Sounds Gallop] : no gallops [Murmurs] : no murmurs [Heart Sounds Pericardial Friction Rub] : no pericardial rub [Bowel Sounds] : normal bowel sounds [Abdomen Soft] : soft [Abdomen Tenderness] : non-tender [] : no hepato-splenomegaly [Abdomen Mass (___ Cm)] : no abdominal mass palpated [Abnormal Walk] : normal gait [Nail Clubbing] : no clubbing  or cyanosis of the fingernails [Musculoskeletal - Swelling] : no joint swelling seen [Motor Tone] : muscle strength and tone were normal [Oriented To Time, Place, And Person] : oriented to person, place, and time [Impaired Insight] : insight and judgment were intact [Affect] : the affect was normal [FreeTextEntry1] : +wearing eyeglasses

## 2023-08-29 NOTE — HISTORY OF PRESENT ILLNESS
[FreeTextEntry1] : 67 y/o M with PMHX of CVA, acute MI, anemia, CKD3, ED thang seen for follow up visit.  Patients feels ok in general. But still has numbing sensation on bilateral legs. Gabapentin was not taken, not really helping him.  Seen by Dr MIX and ID/[facility]non for Nephrolithiasis and renal abscess He is urinating good / regularly now, but looked cloudy.  Tested for his abdomen in [place]- E coli. positive  His Cardio MD Dr Mcdonald

## 2023-09-04 LAB
24R-OH-CALCIDIOL SERPL-MCNC: 38.7 PG/ML
ALBUMIN MFR SERPL ELPH: 59.9 %
ALBUMIN SERPL ELPH-MCNC: 4.8 G/DL
ALBUMIN SERPL-MCNC: 4.4 G/DL
ALBUMIN/GLOB SERPL: 1.5 RATIO
ALDOSTERONE SERUM: 7.8 NG/DL
ALP BLD-CCNC: 84 U/L
ALP BONE SERPL-MCNC: 14.9 UG/L
ALPHA1 GLOB MFR SERPL ELPH: 3.6 %
ALPHA1 GLOB SERPL ELPH-MCNC: 0.3 G/DL
ALPHA2 GLOB MFR SERPL ELPH: 9.4 %
ALPHA2 GLOB SERPL ELPH-MCNC: 0.7 G/DL
ALT SERPL-CCNC: 10 U/L
ANA SER IF-ACNC: NEGATIVE
ANION GAP SERPL CALC-SCNC: 15 MMOL/L
APPEARANCE: ABNORMAL
AST SERPL-CCNC: 15 U/L
B-GLOBULIN MFR SERPL ELPH: 9.2 %
B-GLOBULIN SERPL ELPH-MCNC: 0.7 G/DL
BILIRUB SERPL-MCNC: 0.8 MG/DL
BILIRUBIN URINE: NEGATIVE
BLOOD URINE: NEGATIVE
BUN SERPL-MCNC: 16 MG/DL
C3 SERPL-MCNC: 118 MG/DL
C4 SERPL-MCNC: 26 MG/DL
CALCIUM SERPL-MCNC: 9.3 MG/DL
CALCIUM SERPL-MCNC: 9.3 MG/DL
CARDIOLIPIN AB SER IA-ACNC: NEGATIVE
CHLORIDE SERPL-SCNC: 103 MMOL/L
CHOLEST SERPL-MCNC: 130 MG/DL
CO2 SERPL-SCNC: 17 MMOL/L
COLLAGEN CTX SERPL-MCNC: 581 PG/ML
COLOR: YELLOW
CORTIS SERPL-MCNC: 4.7 UG/DL
CREAT SERPL-MCNC: 1.28 MG/DL
CREAT SPEC-SCNC: 122 MG/DL
CREAT/PROT UR: 0.2 RATIO
CYSTATIN C SERPL-MCNC: 1.43 MG/L
DEPRECATED KAPPA LC FREE/LAMBDA SER: 1.17 RATIO
EGFR: 62 ML/MIN/1.73M2
ESTIMATED AVERAGE GLUCOSE: 108 MG/DL
FERRITIN SERPL-MCNC: 295 NG/ML
FOLATE SERPL-MCNC: 9.9 NG/ML
GAMMA GLOB FLD ELPH-MCNC: 1.3 G/DL
GAMMA GLOB MFR SERPL ELPH: 17.9 %
GFR/BSA.PRED SERPLBLD CYS-BASED-ARV: 47 ML/MIN/1.73M2
GLUCOSE QUALITATIVE U: NEGATIVE MG/DL
GLUCOSE SERPL-MCNC: 99 MG/DL
HBA1C MFR BLD HPLC: 5.4 %
HCYS SERPL-MCNC: 14.6 UMOL/L
HDLC SERPL-MCNC: 43 MG/DL
IGA 24H UR QL IFE: NORMAL
IGA SER QL IEP: 94 MG/DL
IGG SER QL IEP: 1304 MG/DL
IGM SER QL IEP: 141 MG/DL
INTERPRETATION SERPL IEP-IMP: NORMAL
IRON SATN MFR SERPL: 25 %
IRON SERPL-MCNC: 79 UG/DL
KAPPA LC CSF-MCNC: 1.9 MG/DL
KAPPA LC SERPL-MCNC: 2.23 MG/DL
KETONES URINE: NEGATIVE MG/DL
LDLC SERPL CALC-MCNC: 55 MG/DL
LEUKOCYTE ESTERASE URINE: ABNORMAL
M PROTEIN SPEC IFE-MCNC: NORMAL
MAGNESIUM SERPL-MCNC: 2.1 MG/DL
METHYLMALONATE SERPL-SCNC: 271 NMOL/L
NITRITE URINE: POSITIVE
NONHDLC SERPL-MCNC: 87 MG/DL
PARATHYROID HORMONE INTACT: 62 PG/ML
PH URINE: 6
PHOSPHATE SERPL-MCNC: 3.8 MG/DL
POTASSIUM SERPL-SCNC: 4.1 MMOL/L
PROT SERPL-MCNC: 7.4 G/DL
PROT UR-MCNC: 21 MG/DL
PROTEIN URINE: NORMAL MG/DL
PSA FREE FLD-MCNC: 21 %
PSA FREE SERPL-MCNC: 0.67 NG/ML
PSA SERPL-MCNC: 3.26 NG/ML
RHEUMATOID FACT SER QL: <10 IU/ML
SODIUM SERPL-SCNC: 136 MMOL/L
SPECIFIC GRAVITY URINE: 1.02
T3FREE SERPL-MCNC: 3.29 PG/ML
T3RU NFR SERPL: 1 TBI
T4 FREE SERPL-MCNC: 1.2 NG/DL
T4 SERPL-MCNC: 6.8 UG/DL
THYROGLOB AB SERPL-ACNC: 29 IU/ML
THYROPEROXIDASE AB SERPL IA-ACNC: 27.1 IU/ML
TIBC SERPL-MCNC: 313 UG/DL
TRIGL SERPL-MCNC: 201 MG/DL
TSH SERPL-ACNC: 2.91 UIU/ML
UIBC SERPL-MCNC: 234 UG/DL
URATE SERPL-MCNC: 4.8 MG/DL
UROBILINOGEN URINE: 0.2 MG/DL
VIT B12 SERPL-MCNC: 375 PG/ML

## 2023-11-05 ENCOUNTER — LABORATORY RESULT (OUTPATIENT)
Age: 68
End: 2023-11-05

## 2023-11-06 ENCOUNTER — APPOINTMENT (OUTPATIENT)
Dept: NEPHROLOGY | Facility: CLINIC | Age: 68
End: 2023-11-06
Payer: MEDICARE

## 2023-11-06 VITALS — DIASTOLIC BLOOD PRESSURE: 70 MMHG | HEART RATE: 72 BPM | SYSTOLIC BLOOD PRESSURE: 132 MMHG

## 2023-11-06 VITALS — DIASTOLIC BLOOD PRESSURE: 80 MMHG | SYSTOLIC BLOOD PRESSURE: 130 MMHG | HEART RATE: 72 BPM

## 2023-11-06 VITALS — BODY MASS INDEX: 38.96 KG/M2 | WEIGHT: 234.13 LBS

## 2023-11-06 PROCEDURE — 90662 IIV NO PRSV INCREASED AG IM: CPT

## 2023-11-06 PROCEDURE — 99214 OFFICE O/P EST MOD 30 MIN: CPT | Mod: 25

## 2023-11-06 PROCEDURE — 36415 COLL VENOUS BLD VENIPUNCTURE: CPT

## 2023-11-06 PROCEDURE — G0008: CPT

## 2023-11-10 ENCOUNTER — APPOINTMENT (OUTPATIENT)
Dept: UROLOGY | Facility: CLINIC | Age: 68
End: 2023-11-10
Payer: MEDICARE

## 2023-11-10 PROCEDURE — 76775 US EXAM ABDO BACK WALL LIM: CPT

## 2023-11-10 PROCEDURE — 99213 OFFICE O/P EST LOW 20 MIN: CPT

## 2023-11-13 ENCOUNTER — NON-APPOINTMENT (OUTPATIENT)
Age: 68
End: 2023-11-13

## 2023-11-13 RX ORDER — SULFAMETHOXAZOLE AND TRIMETHOPRIM 800; 160 MG/1; MG/1
800-160 TABLET ORAL TWICE DAILY
Qty: 14 | Refills: 0 | Status: ACTIVE | COMMUNITY
Start: 2023-11-13 | End: 1900-01-01

## 2023-11-14 ENCOUNTER — NON-APPOINTMENT (OUTPATIENT)
Age: 68
End: 2023-11-14

## 2023-11-15 ENCOUNTER — NON-APPOINTMENT (OUTPATIENT)
Age: 68
End: 2023-11-15

## 2023-11-15 RX ORDER — PHENAZOPYRIDINE HYDROCHLORIDE 100 MG/1
100 TABLET ORAL 3 TIMES DAILY
Qty: 9 | Refills: 0 | Status: ACTIVE | COMMUNITY
Start: 2023-11-15 | End: 1900-01-01

## 2023-11-17 ENCOUNTER — NON-APPOINTMENT (OUTPATIENT)
Age: 68
End: 2023-11-17

## 2023-12-27 ENCOUNTER — APPOINTMENT (OUTPATIENT)
Dept: NEPHROLOGY | Facility: CLINIC | Age: 68
End: 2023-12-27

## 2024-03-25 ENCOUNTER — LABORATORY RESULT (OUTPATIENT)
Age: 69
End: 2024-03-25

## 2024-03-25 ENCOUNTER — APPOINTMENT (OUTPATIENT)
Dept: NEPHROLOGY | Facility: CLINIC | Age: 69
End: 2024-03-25
Payer: MEDICARE

## 2024-03-25 VITALS — HEIGHT: 65 IN | BODY MASS INDEX: 39.15 KG/M2 | WEIGHT: 235 LBS

## 2024-03-25 DIAGNOSIS — N17.9 ACUTE KIDNEY FAILURE, UNSPECIFIED: ICD-10-CM

## 2024-03-25 DIAGNOSIS — N39.0 SEPSIS, UNSPECIFIED ORGANISM: ICD-10-CM

## 2024-03-25 DIAGNOSIS — R79.89 OTHER SPECIFIED ABNORMAL FINDINGS OF BLOOD CHEMISTRY: ICD-10-CM

## 2024-03-25 DIAGNOSIS — M79.671 PAIN IN RIGHT FOOT: ICD-10-CM

## 2024-03-25 DIAGNOSIS — N18.30 CHRONIC KIDNEY DISEASE, STAGE 3 UNSPECIFIED: ICD-10-CM

## 2024-03-25 DIAGNOSIS — R31.0 GROSS HEMATURIA: ICD-10-CM

## 2024-03-25 DIAGNOSIS — J96.01 ACUTE RESPIRATORY FAILURE WITH HYPOXIA: ICD-10-CM

## 2024-03-25 DIAGNOSIS — Z93.6 OTHER ARTIFICIAL OPENINGS OF URINARY TRACT STATUS: ICD-10-CM

## 2024-03-25 DIAGNOSIS — D64.9 ANEMIA, UNSPECIFIED: ICD-10-CM

## 2024-03-25 DIAGNOSIS — A41.9 SEPSIS, UNSPECIFIED ORGANISM: ICD-10-CM

## 2024-03-25 DIAGNOSIS — I63.9 CEREBRAL INFARCTION, UNSPECIFIED: ICD-10-CM

## 2024-03-25 DIAGNOSIS — M79.672 PAIN IN RIGHT FOOT: ICD-10-CM

## 2024-03-25 DIAGNOSIS — N52.9 MALE ERECTILE DYSFUNCTION, UNSPECIFIED: ICD-10-CM

## 2024-03-25 DIAGNOSIS — I21.9 ACUTE MYOCARDIAL INFARCTION, UNSPECIFIED: ICD-10-CM

## 2024-03-25 DIAGNOSIS — N39.0 URINARY TRACT INFECTION, SITE NOT SPECIFIED: ICD-10-CM

## 2024-03-25 PROCEDURE — 36415 COLL VENOUS BLD VENIPUNCTURE: CPT

## 2024-03-25 PROCEDURE — 99214 OFFICE O/P EST MOD 30 MIN: CPT

## 2024-03-25 PROCEDURE — G2211 COMPLEX E/M VISIT ADD ON: CPT

## 2024-03-26 VITALS — DIASTOLIC BLOOD PRESSURE: 82 MMHG | SYSTOLIC BLOOD PRESSURE: 131 MMHG | HEART RATE: 61 BPM

## 2024-03-26 VITALS — DIASTOLIC BLOOD PRESSURE: 87 MMHG | HEART RATE: 65 BPM | SYSTOLIC BLOOD PRESSURE: 145 MMHG

## 2024-03-26 VITALS — SYSTOLIC BLOOD PRESSURE: 129 MMHG | HEART RATE: 65 BPM | DIASTOLIC BLOOD PRESSURE: 81 MMHG

## 2024-03-26 NOTE — PHYSICAL EXAM
[General Appearance - Alert] : alert [General Appearance - In No Acute Distress] : in no acute distress [] : no respiratory distress [Auscultation Breath Sounds / Voice Sounds] : lungs were clear to auscultation bilaterally [Heart Rate And Rhythm] : heart rate was normal and rhythm regular [Heart Sounds] : normal S1 and S2 [Heart Sounds Gallop] : no gallops [Murmurs] : no murmurs [Heart Sounds Pericardial Friction Rub] : no pericardial rub [Full Pulse] : the pedal pulses are present [Edema] : there was no peripheral edema [Abnormal Walk] : normal gait [Nail Clubbing] : no clubbing  or cyanosis of the fingernails [Musculoskeletal - Swelling] : no joint swelling seen [Motor Tone] : muscle strength and tone were normal [Oriented To Time, Place, And Person] : oriented to person, place, and time [Impaired Insight] : insight and judgment were intact [Affect] : the affect was normal [FreeTextEntry1] : + WEARS EYEGLASSES

## 2024-03-26 NOTE — ADDENDUM
[FreeTextEntry1] : BP/HR taken in different positions (sitting standing and lying down) and d/w pt Self-monitoring at home advised i.e. BP, FS, etc. Medications updated Diet/healthy lifestyle counselling New labs ordered. Follow up in 2-3 months.

## 2024-03-26 NOTE — HISTORY OF PRESENT ILLNESS
[FreeTextEntry1] : 68 y/o M with PMHX of CVA, acute MI, anemia, CKD3, ED thang seen for follow up visit. Last seen 11/06/23.  Pt. feels generally. ROS is negative otherwise.  Still has stone in his left stone. Reports abrupt nose bleeds for the past few weeks.   Followed by Dr. Trinh. Per his 11/10/23 note: "CORRY AGUILERA is a 68 year old male with complicated kidney stone history including a right PCNL for right struvite staghorn complicated by septic shock, renal abscess, and bleeding, requiring ICU care, blood transfusions, and drainage of abscess. Has a known 1.1 cm lower pole non-obstructing stone in the left kidney - stone is radiopaque on KUB suggesting calcium composition - on allopurinol from a previous doctor but no recollection of previous 24-hour urinalysis results."  Diagnosed with severe neuropathy.  Denies pain. Admits tingling and numbness. Cannot feel his foot when he accidentally injures it. Decreased ambulation speed.  Says he can walk more in the morning than at night.  Admits seeing a specialist for neuropathy. Does not remember his name. Completed two exams to confirm diagnosis. Would like to see another specialist.   Would like a prescription to PT.

## 2024-04-07 LAB
24R-OH-CALCIDIOL SERPL-MCNC: 47.1 PG/ML
24R-OH-CALCIDIOL SERPL-MCNC: 55.7 PG/ML
25(OH)D3 SERPL-MCNC: 18.7 NG/ML
25(OH)D3 SERPL-MCNC: 18.7 NG/ML
ALBUMIN MFR SERPL ELPH: 60.5 %
ALBUMIN MFR SERPL ELPH: 60.6 %
ALBUMIN SERPL ELPH-MCNC: 4.6 G/DL
ALBUMIN SERPL ELPH-MCNC: 4.6 G/DL
ALBUMIN SERPL ELPH-MCNC: 4.7 G/DL
ALBUMIN SERPL-MCNC: 4.4 G/DL
ALBUMIN SERPL-MCNC: 4.4 G/DL
ALBUMIN/GLOB SERPL: 1.5 RATIO
ALBUMIN/GLOB SERPL: 1.6 RATIO
ALDOSTERONE SERUM: 9.2 NG/DL
ALP BLD-CCNC: 84 U/L
ALP BLD-CCNC: 85 U/L
ALP BLD-CCNC: 88 U/L
ALP BONE SERPL-MCNC: 14.1 UG/L
ALP BONE SERPL-MCNC: 14.8 UG/L
ALPHA1 GLOB MFR SERPL ELPH: 3.7 %
ALPHA1 GLOB MFR SERPL ELPH: 3.8 %
ALPHA1 GLOB SERPL ELPH-MCNC: 0.3 G/DL
ALPHA1 GLOB SERPL ELPH-MCNC: 0.3 G/DL
ALPHA2 GLOB MFR SERPL ELPH: 9.3 %
ALPHA2 GLOB MFR SERPL ELPH: 9.5 %
ALPHA2 GLOB SERPL ELPH-MCNC: 0.7 G/DL
ALPHA2 GLOB SERPL ELPH-MCNC: 0.7 G/DL
ALT SERPL-CCNC: 10 U/L
ALT SERPL-CCNC: 11 U/L
ALT SERPL-CCNC: 12 U/L
ANA SER IF-ACNC: NEGATIVE
ANION GAP SERPL CALC-SCNC: 14 MMOL/L
ANION GAP SERPL CALC-SCNC: 14 MMOL/L
ANION GAP SERPL CALC-SCNC: 17 MMOL/L
APPEARANCE: CLEAR
AST SERPL-CCNC: 12 U/L
AST SERPL-CCNC: 13 U/L
AST SERPL-CCNC: 14 U/L
B-GLOBULIN MFR SERPL ELPH: 9.4 %
B-GLOBULIN MFR SERPL ELPH: 9.5 %
B-GLOBULIN SERPL ELPH-MCNC: 0.7 G/DL
B-GLOBULIN SERPL ELPH-MCNC: 0.7 G/DL
BACTERIA UR CULT: ABNORMAL
BACTERIA: ABNORMAL /HPF
BILIRUB SERPL-MCNC: 0.4 MG/DL
BILIRUBIN URINE: NEGATIVE
BLOOD URINE: NEGATIVE
BUN SERPL-MCNC: 15 MG/DL
BUN SERPL-MCNC: 16 MG/DL
BUN SERPL-MCNC: 20 MG/DL
C3 SERPL-MCNC: 121 MG/DL
C4 SERPL-MCNC: 27 MG/DL
CALCIUM SERPL-MCNC: 8.9 MG/DL
CALCIUM SERPL-MCNC: 8.9 MG/DL
CALCIUM SERPL-MCNC: 9.2 MG/DL
CALCIUM SERPL-MCNC: 9.5 MG/DL
CALCIUM SERPL-MCNC: 9.5 MG/DL
CAST: 0 /LPF
CAST: 1 /LPF
CAST: 1 /LPF
CHLORIDE ?TM UR-SCNC: 135 MMOL/L
CHLORIDE SERPL-SCNC: 103 MMOL/L
CHLORIDE SERPL-SCNC: 107 MMOL/L
CHLORIDE SERPL-SCNC: 108 MMOL/L
CHOLEST SERPL-MCNC: 117 MG/DL
CHOLEST SERPL-MCNC: 120 MG/DL
CHOLEST SERPL-MCNC: 120 MG/DL
CO2 SERPL-SCNC: 17 MMOL/L
CO2 SERPL-SCNC: 18 MMOL/L
CO2 SERPL-SCNC: 18 MMOL/L
COLLAGEN CTX SERPL-MCNC: 396 PG/ML
COLLAGEN CTX SERPL-MCNC: 503 PG/ML
COLOR: YELLOW
CREAT SERPL-MCNC: 1.27 MG/DL
CREAT SPEC-SCNC: 60 MG/DL
CREAT SPEC-SCNC: 86 MG/DL
CREAT/PROT UR: 0.2 RATIO
CREAT/PROT UR: 0.2 RATIO
CRP SERPL-MCNC: <3 MG/L
CRP SERPL-MCNC: <3 MG/L
CYSTATIN C SERPL-MCNC: 1.36 MG/L
DEPRECATED KAPPA LC FREE/LAMBDA SER: 1.16 RATIO
DEPRECATED KAPPA LC FREE/LAMBDA SER: 1.24 RATIO
EGFR: 62 ML/MIN/1.73M2
EPITHELIAL CELLS: 1 /HPF
ERYTHROCYTE [SEDIMENTATION RATE] IN BLOOD BY WESTERGREN METHOD: 12 MM/HR
ERYTHROCYTE [SEDIMENTATION RATE] IN BLOOD BY WESTERGREN METHOD: 8 MM/HR
ESTIMATED AVERAGE GLUCOSE: 103 MG/DL
FERRITIN SERPL-MCNC: 191 NG/ML
FERRITIN SERPL-MCNC: 225 NG/ML
FOLATE SERPL-MCNC: 7.9 NG/ML
FOLATE SERPL-MCNC: 8.5 NG/ML
FREE KAPPA URINE: 35.38 MG/L
FREE KAPPA/LAMDA RATIO: 5.13 RATIO
FREE LAMDA URINE: 6.9 MG/L
GAMMA GLOB FLD ELPH-MCNC: 1.2 G/DL
GAMMA GLOB FLD ELPH-MCNC: 1.2 G/DL
GAMMA GLOB MFR SERPL ELPH: 16.7 %
GAMMA GLOB MFR SERPL ELPH: 17 %
GFR/BSA.PRED SERPLBLD CYS-BASED-ARV: 50 ML/MIN/1.73M2
GLUCOSE QUALITATIVE U: NEGATIVE MG/DL
GLUCOSE SERPL-MCNC: 58 MG/DL
GLUCOSE SERPL-MCNC: 94 MG/DL
GLUCOSE SERPL-MCNC: 95 MG/DL
HBA1C MFR BLD HPLC: 5.2 %
HBV SURFACE AB SER QL: NONREACTIVE
HBV SURFACE AG SER QL: NONREACTIVE
HCT VFR BLD CALC: 40.8 %
HCT VFR BLD CALC: 42.5 %
HCV AB SER QL: NONREACTIVE
HCV S/CO RATIO: 0.11 S/CO
HCYS SERPL-MCNC: 13.5 UMOL/L
HCYS SERPL-MCNC: 18.6 UMOL/L
HDLC SERPL-MCNC: 44 MG/DL
HDLC SERPL-MCNC: 44 MG/DL
HDLC SERPL-MCNC: 45 MG/DL
HGB BLD-MCNC: 13.3 G/DL
HGB BLD-MCNC: 14 G/DL
IF BLOCK AB SER QL: 1.1 AU/ML
IGA 24H UR QL IFE: NORMAL
IGA SER QL IEP: 83 MG/DL
IGA SER QL IEP: 88 MG/DL
IGG SER QL IEP: 1209 MG/DL
IGG SER QL IEP: 1213 MG/DL
IGM SER QL IEP: 117 MG/DL
IGM SER QL IEP: 132 MG/DL
INTERPRETATION SERPL IEP-IMP: NORMAL
INTERPRETATION SERPL IEP-IMP: NORMAL
IRON SATN MFR SERPL: 21 %
IRON SATN MFR SERPL: 24 %
IRON SERPL-MCNC: 62 UG/DL
IRON SERPL-MCNC: 68 UG/DL
KAPPA LC CSF-MCNC: 1.7 MG/DL
KAPPA LC CSF-MCNC: 1.74 MG/DL
KAPPA LC SERPL-MCNC: 2.02 MG/DL
KAPPA LC SERPL-MCNC: 2.11 MG/DL
KETONES URINE: NEGATIVE MG/DL
LDLC SERPL CALC-MCNC: 50 MG/DL
LDLC SERPL CALC-MCNC: 52 MG/DL
LDLC SERPL CALC-MCNC: 56 MG/DL
LEUKOCYTE ESTERASE URINE: ABNORMAL
M PROTEIN SPEC IFE-MCNC: NORMAL
M PROTEIN SPEC IFE-MCNC: NORMAL
MAGNESIUM SERPL-MCNC: 1.9 MG/DL
MAGNESIUM SERPL-MCNC: 1.9 MG/DL
MAGNESIUM SERPL-MCNC: 2 MG/DL
MCHC RBC-ENTMCNC: 30 PG
MCHC RBC-ENTMCNC: 30.7 PG
MCHC RBC-ENTMCNC: 31.3 GM/DL
MCHC RBC-ENTMCNC: 34.3 GM/DL
MCV RBC AUTO: 89.5 FL
MCV RBC AUTO: 95.7 FL
METHYLMALONATE SERPL-SCNC: 254 NMOL/L
METHYLMALONATE SERPL-SCNC: 305 NMOL/L
MICROSCOPIC-UA: NORMAL
NITRITE URINE: POSITIVE
NONHDLC SERPL-MCNC: 73 MG/DL
NONHDLC SERPL-MCNC: 75 MG/DL
NONHDLC SERPL-MCNC: 76 MG/DL
NT-PROBNP SERPL-MCNC: 39 PG/ML
NT-PROBNP SERPL-MCNC: <36 PG/ML
OSMOLALITY UR: 596 MOSM/KG
PARATHYROID HORMONE INTACT: 51 PG/ML
PARATHYROID HORMONE INTACT: 65 PG/ML
PCA AB SER QL IF: NORMAL
PH URINE: 5.5
PH URINE: 6.5
PH URINE: 6.5
PHOSPHATE SERPL-MCNC: 3.4 MG/DL
PHOSPHATE SERPL-MCNC: 3.7 MG/DL
PHOSPHATE SERPL-MCNC: 3.7 MG/DL
PLATELET # BLD AUTO: 117 K/UL
PLATELET # BLD AUTO: 141 K/UL
POTASSIUM SERPL-SCNC: 4.1 MMOL/L
POTASSIUM SERPL-SCNC: 4.1 MMOL/L
POTASSIUM SERPL-SCNC: 4.3 MMOL/L
POTASSIUM UR-SCNC: 40.4 MMOL/L
PROT SERPL-MCNC: 7 G/DL
PROT SERPL-MCNC: 7.2 G/DL
PROT SERPL-MCNC: 7.3 G/DL
PROT UR-MCNC: 10 MG/DL
PROT UR-MCNC: 15 MG/DL
PROTEIN URINE: NEGATIVE MG/DL
PSA FREE FLD-MCNC: 23 %
PSA FREE SERPL-MCNC: 0.42 NG/ML
PSA SERPL-MCNC: 1.81 NG/ML
RBC # BLD: 4.44 M/UL
RBC # BLD: 4.56 M/UL
RBC # FLD: 13.9 %
RBC # FLD: 14.5 %
RED BLOOD CELLS URINE: 0 /HPF
RHEUMATOID FACT SER QL: <10 IU/ML
SODIUM ?TM SUB UR QN: 141 MMOL/L
SODIUM SERPL-SCNC: 137 MMOL/L
SODIUM SERPL-SCNC: 140 MMOL/L
SODIUM SERPL-SCNC: 141 MMOL/L
SPECIFIC GRAVITY URINE: 1.01
SPECIFIC GRAVITY URINE: 1.01
SPECIFIC GRAVITY URINE: 1.02
T3FREE SERPL-MCNC: 3.23 PG/ML
T3RU NFR SERPL: 1 TBI
T4 FREE SERPL-MCNC: 1.1 NG/DL
T4 SERPL-MCNC: 5.8 UG/DL
THYROGLOB AB SERPL-ACNC: 57.6 IU/ML
THYROPEROXIDASE AB SERPL IA-ACNC: 26.2 IU/ML
TIBC SERPL-MCNC: 282 UG/DL
TIBC SERPL-MCNC: 295 UG/DL
TRIGL SERPL-MCNC: 101 MG/DL
TRIGL SERPL-MCNC: 133 MG/DL
TRIGL SERPL-MCNC: 134 MG/DL
TSH SERPL-ACNC: 2.41 UIU/ML
UIBC SERPL-MCNC: 214 UG/DL
UIBC SERPL-MCNC: 233 UG/DL
URATE SERPL-MCNC: 4.4 MG/DL
URATE SERPL-MCNC: 4.4 MG/DL
URATE SERPL-MCNC: 4.6 MG/DL
UROBILINOGEN URINE: 0.2 MG/DL
VIT B12 SERPL-MCNC: 301 PG/ML
VIT B12 SERPL-MCNC: 353 PG/ML
WBC # FLD AUTO: 6.99 K/UL
WBC # FLD AUTO: 9.73 K/UL
WHITE BLOOD CELLS URINE: 24 /HPF
WHITE BLOOD CELLS URINE: 25 /HPF
WHITE BLOOD CELLS URINE: 69 /HPF

## 2024-05-24 ENCOUNTER — APPOINTMENT (OUTPATIENT)
Dept: UROLOGY | Facility: CLINIC | Age: 69
End: 2024-05-24
Payer: MEDICARE

## 2024-05-24 DIAGNOSIS — N20.0 CALCULUS OF KIDNEY: ICD-10-CM

## 2024-05-24 PROCEDURE — 76775 US EXAM ABDO BACK WALL LIM: CPT

## 2024-05-24 PROCEDURE — 99213 OFFICE O/P EST LOW 20 MIN: CPT

## 2024-05-24 NOTE — HISTORY OF PRESENT ILLNESS
[FreeTextEntry1] : Name CORRY AGUILERA MRN [id number]  1955 ------------------------------------------------------------------------------------------------------------------------------------------- Date of First Visit:  Referring Provider/PCP: Manuel Pelaez / Binh Dc ------------------------------------------------------------------------------------------------------------------------------------------- CC: nephrolithiasis, h/o septic shock and renal abscess  History of Present Illness: CORRY AGUILERA is a 68 year old make h/o CVA, CKD3, CAD s/p PCI on Plavix and nephrolithiasis who presents for evaluation of kidney stones. He underwent right PCNL for staghorn stone with Dr. Corral in 2022, complicated by septic shock requiring ICU admission to [facility] and significant postoperative bleeding requiring blood transfusions. Surgery was also complicated by a 7 cm right renal abscess requiring multiple drainage procedures, including tube upsizing and instillations. Cx positive for Proteus. States he has ongoing LLE weakness from "nerve damage" following septic shock and prolonged care/rehab care. Has not had left sided stones treated but states they have been there for a while. CT from 2023 (ordered by Dr. Corral) noted a 1.1 cm left lower pole stone. Of note, patient has had persistently positive urine cultures, but has been asymptomatic - recently evaluated by Dr. Willett of ID. Patient has been on allopurinol, believe for kidney stone prevention. Does not recall who initially prescribed it. Does not recall previous 24-hour urinalysis results (we are unable to locate them in [employer] portal). Serum UA 4.3 4/3/23 on treatment; previously high-normal at 6.9 , also on treatment at that time.  Transitioning care to [facility] including stone surveillance/prevention.  Imaging: CT scan from 2023 (report only) can be found in [facility] PACS. Findings: Complex cystic lesion with air-fluid level abutting the upper pole of the right kidney is noted with some tracking pockets of fluid and air along the right ureter. This appearance is improved form prior study. Left staghorn calculi in the lower pole of the let kidney measures 11mm..  Previous urine cultures: 2023: >100,000 CFU/ml Escherichia coli 2023: >100,000 CFU/ml Escherichia coli 2022: >100,000 CFU/ml Escherichia coli 2021: >100,000 CFU/ml Proteus mirabilis ESBL  Kidney Stone History: First-time stone former - No Concurrent asymptomatic stone(s) - Yes Previous stone surgeries - Yes Previous passed stones - Yes Comorbidities - obesity Previous metabolic evaluation - Yes - unknown results; currently on allopurinol ---------------------------------------------------------------------------------------------------------------------------------------------------------------- Interval History (11/10/2023): Patient presents for follow-up kidney stone surveillance visit with renal ultrasound. Doing well, no complaints. No recent stone-related events. Ultrasound performed in the office today demonstrates no evidence of hydronephrosis bilaterally. Re-demonstration of known left lower pole 11mm stone. No recent 24-hour urinalysis. ---------------------------------------------------------------------------------------------------------------------------------------------------------------- Interval History (2024): Patient presents for follow-up kidney stone surveillance visit. Ultrasound performed in the office today demonstrates no evidence of hydronephrosis bilaterally. Re-demonstration of known left lower pole stone, now 13 mm from 11 mm 6 months ago. No recent 24-hour urinalysis. Remains on Allopurinol 300 mg daily (ordered by another physician 30 years ago).   On another note, patient today endorses issues with ED "for several years." Has tried PDE5i therapy in the past but states it wasn't sufficient. Does not recall which medication or dose but used it as needed. He is interested in other options as well. On questioning about libido, states it is there but more so emphasizes the importance of this responsibility for his relationship. Denies ejaculatory dysfunction. Able to attain erections which are weak and infrequently sufficient for intromission.

## 2024-05-24 NOTE — ASSESSMENT
[FreeTextEntry1] : Assessment: CORRY AGUILERA is a 70 y/o male with complicated kidney stone history including a right PCNL at [facility] for right struvite staghorn complicated by septic shock, renal abscess, and bleeding, requiring ICU care, blood transfusions, and drainage of abscess. Has a known 1.1 cm lower pole non-obstructing stone in the left kidney - stone is radiopaque on KUB - on allopurinol from a previous doctor but no recollection of previous 24-hour urinalysis results.  Plan:  -Follow up visit in 6 months with renal ultrasound for surveillance of left non-obstructing stone -Litholink ordered -Continue with generalized stone prevention strategies as previously discussed (increase fluid intake to keep urine clear or very faint yellow, limit dietary salt intake, increase fruits and vegetables, limit high oxalate foods, maintain normal dietary calcium, and moderation of non-dairy animal protein). -Testosterone -GLORY to Dr. Bright for further ED evaluation and management

## 2024-06-24 ENCOUNTER — APPOINTMENT (OUTPATIENT)
Dept: UROLOGY | Facility: CLINIC | Age: 69
End: 2024-06-24
Payer: MEDICARE

## 2024-06-24 DIAGNOSIS — N52.9 MALE ERECTILE DYSFUNCTION, UNSPECIFIED: ICD-10-CM

## 2024-06-24 PROCEDURE — G2211 COMPLEX E/M VISIT ADD ON: CPT

## 2024-06-24 PROCEDURE — 99214 OFFICE O/P EST MOD 30 MIN: CPT

## 2024-06-24 NOTE — HISTORY OF PRESENT ILLNESS
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  The patient states his erectile dysfunction started after 2022 He notes mild tumescence but not rigid enough for sexual intercourse able to reach orgasm with retrograde ejaculation secondary to alfuzosin  He has taken sildenafil ; without significant improvement He has not taken on empty stomach He did not tolerate tadalafil 20 mg; did not tolerate; did not like the way he felt

## 2024-06-24 NOTE — ASSESSMENT
[FreeTextEntry1] : Mr. Artemio Dunham is a 69-year-old gentleman who has been  for 48 years who has 11 children who presents with a chief complaint of erectile dysfunction.  He states this began in 2022 after a complex urologic history with urosepsis after PCNL.  Prior to that time he had no erectile dysfunction.   Patient states that he has mild penile tumescence but there is insufficient rigidity for intromission.  He is taking both tadalafil and sildenafil.  He tolerates sildenafil better.  However the quality of erection is still insufficient for sexual intercourse.  He does continue to reach orgasm.  He experiences retrograde ejaculation on alfuzosin.  DIscussed  treatment options including PD 5-I, Intracavernosal therapy, erection vacuum device. Instructional materials provided to patient. Risks and side effects of each discussed. He was instructed regarding timing of sildenafil as well as strategies to increase its effectiveness.  The patient is not interested in invasive therapies and is interested in proceeding with EVD.  The device was demonstrated to the patient.  He was instructed that he may lysis in conjunction with sildenafil.  He was instructed to shave his pubic hair prior to utilizing the EVD.  Warned re prolonged erection or prolonged application of constriction band. Patient chooses to proceed with  EVD with sildenafil Informational materials given to patient.  Plan: endocrine EVD fu in one month with EVD The ARTEMIO AGUILERA  expressed fully understanding of the information provided, the consequences and the management.

## 2024-06-24 NOTE — PHYSICAL EXAM
[Abdomen Soft] : soft [Abdomen Tenderness] : non-tender [] : no hepato-splenomegaly [Abdomen Mass (___ Cm)] : no abdominal mass palpated [Abdomen Hernia] : no hernia was discovered [Costovertebral Angle Tenderness] : no ~M costovertebral angle tenderness [Urethral Meatus] : meatus normal [Penis Abnormality] : normal circumcised penis [Epididymis] : the epididymides were normal [Testes Tenderness] : no tenderness of the testes [Testes Mass (___cm)] : there were no testicular masses [de-identified] : phallus 10.5 cm on stretch

## 2024-06-25 ENCOUNTER — NON-APPOINTMENT (OUTPATIENT)
Age: 69
End: 2024-06-25

## 2024-06-25 LAB
PROLACTIN SERPL-MCNC: 14.2 NG/ML
TESTOST SERPL-MCNC: 490 NG/DL

## 2024-06-28 LAB — ESTROGEN SERPL-MCNC: 128 PG/ML

## 2024-07-17 ENCOUNTER — NON-APPOINTMENT (OUTPATIENT)
Age: 69
End: 2024-07-17

## 2024-07-17 DIAGNOSIS — N39.0 URINARY TRACT INFECTION, SITE NOT SPECIFIED: ICD-10-CM

## 2024-07-17 RX ORDER — SULFAMETHOXAZOLE AND TRIMETHOPRIM 800; 160 MG/1; MG/1
800-160 TABLET ORAL
Qty: 14 | Refills: 0 | Status: ACTIVE | COMMUNITY
Start: 2024-07-17 | End: 1900-01-01

## 2024-07-23 ENCOUNTER — NON-APPOINTMENT (OUTPATIENT)
Age: 69
End: 2024-07-23

## 2024-07-23 LAB — BACTERIA UR CULT: ABNORMAL

## 2024-08-12 ENCOUNTER — APPOINTMENT (OUTPATIENT)
Dept: UROLOGY | Facility: CLINIC | Age: 69
End: 2024-08-12

## 2024-08-12 ENCOUNTER — NON-APPOINTMENT (OUTPATIENT)
Age: 69
End: 2024-08-12

## 2024-08-12 DIAGNOSIS — N39.0 URINARY TRACT INFECTION, SITE NOT SPECIFIED: ICD-10-CM

## 2024-11-22 ENCOUNTER — APPOINTMENT (OUTPATIENT)
Dept: UROLOGY | Facility: CLINIC | Age: 69
End: 2024-11-22
Payer: MEDICARE

## 2024-11-22 VITALS
SYSTOLIC BLOOD PRESSURE: 154 MMHG | HEART RATE: 63 BPM | DIASTOLIC BLOOD PRESSURE: 88 MMHG | TEMPERATURE: 98.2 F | OXYGEN SATURATION: 97 % | WEIGHT: 250 LBS | BODY MASS INDEX: 41.65 KG/M2 | HEIGHT: 65 IN

## 2024-11-22 PROCEDURE — 76775 US EXAM ABDO BACK WALL LIM: CPT

## 2024-11-22 PROCEDURE — 99213 OFFICE O/P EST LOW 20 MIN: CPT

## 2024-11-22 PROCEDURE — G2211 COMPLEX E/M VISIT ADD ON: CPT

## 2024-11-25 ENCOUNTER — NON-APPOINTMENT (OUTPATIENT)
Age: 69
End: 2024-11-25

## 2024-11-26 ENCOUNTER — NON-APPOINTMENT (OUTPATIENT)
Age: 69
End: 2024-11-26

## 2024-11-26 DIAGNOSIS — N39.0 URINARY TRACT INFECTION, SITE NOT SPECIFIED: ICD-10-CM

## 2024-11-26 LAB
ALBUMIN SERPL ELPH-MCNC: 4.5 G/DL
ALP BLD-CCNC: 82 U/L
ALT SERPL-CCNC: 16 U/L
ANION GAP SERPL CALC-SCNC: 17 MMOL/L
APPEARANCE: ABNORMAL
AST SERPL-CCNC: 15 U/L
BACTERIA: NEGATIVE /HPF
BILIRUB SERPL-MCNC: 0.9 MG/DL
BILIRUBIN URINE: NEGATIVE
BLOOD URINE: NEGATIVE
BUN SERPL-MCNC: 13 MG/DL
CALCIUM SERPL-MCNC: 9.4 MG/DL
CAST: 0 /LPF
CHLORIDE SERPL-SCNC: 102 MMOL/L
CO2 SERPL-SCNC: 22 MMOL/L
COLOR: YELLOW
CREAT SERPL-MCNC: 1.43 MG/DL
EGFR: 54 ML/MIN/1.73M2
EPITHELIAL CELLS: 0 /HPF
GLUCOSE QUALITATIVE U: NEGATIVE MG/DL
GLUCOSE SERPL-MCNC: 79 MG/DL
HCT VFR BLD CALC: 45.4 %
HGB BLD-MCNC: 14.3 G/DL
KETONES URINE: NEGATIVE MG/DL
LEUKOCYTE ESTERASE URINE: ABNORMAL
MCHC RBC-ENTMCNC: 30.3 PG
MCHC RBC-ENTMCNC: 31.5 G/DL
MCV RBC AUTO: 96.2 FL
MICROSCOPIC-UA: NORMAL
NITRITE URINE: POSITIVE
PH URINE: 6
PLATELET # BLD AUTO: 126 K/UL
POTASSIUM SERPL-SCNC: 4 MMOL/L
PROT SERPL-MCNC: 7.3 G/DL
PROTEIN URINE: NORMAL MG/DL
RBC # BLD: 4.72 M/UL
RBC # FLD: 14.6 %
RED BLOOD CELLS URINE: 1 /HPF
REVIEW: NORMAL
SODIUM SERPL-SCNC: 141 MMOL/L
SPECIFIC GRAVITY URINE: 1.01
UROBILINOGEN URINE: 0.2 MG/DL
WBC # FLD AUTO: 16.67 K/UL
WBC CLUMPS: PRESENT
WHITE BLOOD CELLS URINE: 209 /HPF

## 2024-12-04 ENCOUNTER — NON-APPOINTMENT (OUTPATIENT)
Age: 69
End: 2024-12-04

## 2024-12-04 LAB — BACTERIA UR CULT: ABNORMAL

## 2024-12-06 LAB
ALBUMIN SERPL ELPH-MCNC: 4.9 G/DL
ALP BLD-CCNC: 87 U/L
ALT SERPL-CCNC: 13 U/L
ANION GAP SERPL CALC-SCNC: 14 MMOL/L
AST SERPL-CCNC: 15 U/L
BILIRUB SERPL-MCNC: 0.3 MG/DL
BUN SERPL-MCNC: 23 MG/DL
CALCIUM SERPL-MCNC: 9.2 MG/DL
CHLORIDE SERPL-SCNC: 104 MMOL/L
CO2 SERPL-SCNC: 22 MMOL/L
CREAT SERPL-MCNC: 1.33 MG/DL
EGFR: 59 ML/MIN/1.73M2
GLUCOSE SERPL-MCNC: 75 MG/DL
HCT VFR BLD CALC: 44.8 %
HGB BLD-MCNC: 14.5 G/DL
MCHC RBC-ENTMCNC: 30.4 PG
MCHC RBC-ENTMCNC: 32.4 G/DL
MCV RBC AUTO: 93.9 FL
PLATELET # BLD AUTO: 193 K/UL
POTASSIUM SERPL-SCNC: 4.7 MMOL/L
PROT SERPL-MCNC: 7.6 G/DL
RBC # BLD: 4.77 M/UL
RBC # FLD: 13.6 %
SODIUM SERPL-SCNC: 140 MMOL/L
WBC # FLD AUTO: 9.25 K/UL

## 2024-12-07 LAB — BACTERIA UR CULT: ABNORMAL

## 2024-12-09 ENCOUNTER — NON-APPOINTMENT (OUTPATIENT)
Age: 69
End: 2024-12-09

## 2024-12-13 ENCOUNTER — APPOINTMENT (OUTPATIENT)
Dept: CT IMAGING | Facility: CLINIC | Age: 69
End: 2024-12-13
Payer: MEDICARE

## 2024-12-13 PROCEDURE — 74176 CT ABD & PELVIS W/O CONTRAST: CPT

## 2024-12-20 ENCOUNTER — APPOINTMENT (OUTPATIENT)
Dept: UROLOGY | Facility: CLINIC | Age: 69
End: 2024-12-20
Payer: MEDICARE

## 2024-12-20 VITALS
DIASTOLIC BLOOD PRESSURE: 84 MMHG | OXYGEN SATURATION: 98 % | TEMPERATURE: 98.3 F | SYSTOLIC BLOOD PRESSURE: 148 MMHG | HEART RATE: 70 BPM

## 2024-12-20 PROCEDURE — G2211 COMPLEX E/M VISIT ADD ON: CPT

## 2024-12-20 PROCEDURE — 51798 US URINE CAPACITY MEASURE: CPT

## 2024-12-20 PROCEDURE — 99214 OFFICE O/P EST MOD 30 MIN: CPT

## 2024-12-30 ENCOUNTER — APPOINTMENT (OUTPATIENT)
Dept: NEPHROLOGY | Facility: CLINIC | Age: 69
End: 2024-12-30
Payer: MEDICARE

## 2024-12-30 ENCOUNTER — LABORATORY RESULT (OUTPATIENT)
Age: 69
End: 2024-12-30

## 2024-12-30 VITALS — SYSTOLIC BLOOD PRESSURE: 134 MMHG | HEART RATE: 65 BPM | DIASTOLIC BLOOD PRESSURE: 79 MMHG

## 2024-12-30 VITALS — DIASTOLIC BLOOD PRESSURE: 83 MMHG | HEART RATE: 71 BPM | SYSTOLIC BLOOD PRESSURE: 146 MMHG

## 2024-12-30 VITALS — BODY MASS INDEX: 43.27 KG/M2 | WEIGHT: 260 LBS

## 2024-12-30 PROCEDURE — G2211 COMPLEX E/M VISIT ADD ON: CPT

## 2024-12-30 PROCEDURE — G0008: CPT

## 2024-12-30 PROCEDURE — 36415 COLL VENOUS BLD VENIPUNCTURE: CPT

## 2024-12-30 PROCEDURE — 90662 IIV NO PRSV INCREASED AG IM: CPT

## 2024-12-30 PROCEDURE — 99214 OFFICE O/P EST MOD 30 MIN: CPT

## 2024-12-31 LAB
24R-OH-CALCIDIOL SERPL-MCNC: 44.1 PG/ML
25(OH)D3 SERPL-MCNC: 17.4 NG/ML
ALBUMIN SERPL ELPH-MCNC: 4.7 G/DL
ALP BLD-CCNC: 86 U/L
ALT SERPL-CCNC: 13 U/L
ANION GAP SERPL CALC-SCNC: 15 MMOL/L
APPEARANCE: ABNORMAL
AST SERPL-CCNC: 14 U/L
BACTERIA: ABNORMAL /HPF
BILIRUB SERPL-MCNC: 0.4 MG/DL
BILIRUBIN URINE: NEGATIVE
BLOOD URINE: NEGATIVE
BUN SERPL-MCNC: 16 MG/DL
CALCIUM SERPL-MCNC: 9.4 MG/DL
CALCIUM SERPL-MCNC: 9.4 MG/DL
CAST: 1 /LPF
CHLORIDE SERPL-SCNC: 107 MMOL/L
CHOLEST SERPL-MCNC: 121 MG/DL
CO2 SERPL-SCNC: 19 MMOL/L
COLOR: YELLOW
CREAT SERPL-MCNC: 1.28 MG/DL
CREAT SPEC-SCNC: 105 MG/DL
CREAT/PROT UR: 0.2 RATIO
CYSTATIN C SERPL-MCNC: 1.46 MG/L
EGFR: 61 ML/MIN/1.73M2
EPITHELIAL CELLS: 0 /HPF
ESTIMATED AVERAGE GLUCOSE: 117 MG/DL
FERRITIN SERPL-MCNC: 166 NG/ML
FOLATE SERPL-MCNC: 9.3 NG/ML
GFR/BSA.PRED SERPLBLD CYS-BASED-ARV: 46 ML/MIN/1.73M2
GLUCOSE QUALITATIVE U: NEGATIVE MG/DL
GLUCOSE SERPL-MCNC: 96 MG/DL
HBA1C MFR BLD HPLC: 5.7 %
HCYS SERPL-MCNC: 12.3 UMOL/L
HDLC SERPL-MCNC: 38 MG/DL
IRON SATN MFR SERPL: 21 %
IRON SERPL-MCNC: 64 UG/DL
KETONES URINE: NEGATIVE MG/DL
LDLC SERPL CALC-MCNC: 50 MG/DL
LEUKOCYTE ESTERASE URINE: ABNORMAL
MAGNESIUM SERPL-MCNC: 2 MG/DL
MICROSCOPIC-UA: NORMAL
NITRITE URINE: POSITIVE
NONHDLC SERPL-MCNC: 83 MG/DL
PARATHYROID HORMONE INTACT: 65 PG/ML
PH URINE: 6
PHOSPHATE SERPL-MCNC: 3.6 MG/DL
POTASSIUM SERPL-SCNC: 4.1 MMOL/L
PROT SERPL-MCNC: 7.6 G/DL
PROT UR-MCNC: 17 MG/DL
PROTEIN URINE: NORMAL MG/DL
RED BLOOD CELLS URINE: 1 /HPF
SODIUM SERPL-SCNC: 141 MMOL/L
SPECIFIC GRAVITY URINE: 1.02
T3FREE SERPL-MCNC: 3.26 PG/ML
T3RU NFR SERPL: 1 TBI
T4 FREE SERPL-MCNC: 1 NG/DL
T4 SERPL-MCNC: 6 UG/DL
THYROGLOB AB SERPL-ACNC: 46.8 IU/ML
THYROPEROXIDASE AB SERPL IA-ACNC: <9 IU/ML
TIBC SERPL-MCNC: 303 UG/DL
TRIGL SERPL-MCNC: 202 MG/DL
TSH SERPL-ACNC: 3.03 UIU/ML
UIBC SERPL-MCNC: 239 UG/DL
URATE SERPL-MCNC: 5 MG/DL
UROBILINOGEN URINE: 0.2 MG/DL
VIT B12 SERPL-MCNC: 358 PG/ML
WHITE BLOOD CELLS URINE: 137 /HPF

## 2025-01-01 LAB
HCT VFR BLD CALC: 44.3 %
HGB BLD-MCNC: 13.7 G/DL
MCHC RBC-ENTMCNC: 29.7 PG
MCHC RBC-ENTMCNC: 30.9 G/DL
MCV RBC AUTO: 95.9 FL
PLATELET # BLD AUTO: 133 K/UL
RBC # BLD: 4.62 M/UL
RBC # FLD: 15 %
WBC # FLD AUTO: 9.15 K/UL

## 2025-01-02 LAB
ALBUMIN MFR SERPL ELPH: 58.9 %
ALBUMIN SERPL-MCNC: 4.5 G/DL
ALBUMIN/GLOB SERPL: 1.5 RATIO
ALPHA1 GLOB MFR SERPL ELPH: 3.8 %
ALPHA1 GLOB SERPL ELPH-MCNC: 0.3 G/DL
ALPHA2 GLOB MFR SERPL ELPH: 9.8 %
ALPHA2 GLOB SERPL ELPH-MCNC: 0.7 G/DL
B-GLOBULIN MFR SERPL ELPH: 10.2 %
B-GLOBULIN SERPL ELPH-MCNC: 0.8 G/DL
DEPRECATED KAPPA LC FREE/LAMBDA SER: 1.15 RATIO
GAMMA GLOB FLD ELPH-MCNC: 1.3 G/DL
GAMMA GLOB MFR SERPL ELPH: 17.3 %
IGA SER QL IEP: 86 MG/DL
IGG SER QL IEP: 1264 MG/DL
IGM SER QL IEP: 151 MG/DL
INTERPRETATION SERPL IEP-IMP: NORMAL
KAPPA LC CSF-MCNC: 1.76 MG/DL
KAPPA LC SERPL-MCNC: 2.02 MG/DL
M PROTEIN SPEC IFE-MCNC: NORMAL
PROT SERPL-MCNC: 7.6 G/DL
PROT SERPL-MCNC: 7.6 G/DL

## 2025-01-03 LAB
ALP BONE SERPL-MCNC: 13.2 UG/L
METHYLMALONATE SERPL-SCNC: 224 NMOL/L

## 2025-01-06 LAB — COLLAGEN CTX SERPL-MCNC: 351 PG/ML

## 2025-01-23 RX ORDER — METOPROLOL SUCCINATE 100 MG/1
100 TABLET, EXTENDED RELEASE ORAL DAILY
Qty: 90 | Refills: 0 | Status: DISCONTINUED | COMMUNITY
Start: 2025-01-16 | End: 2025-01-23

## 2025-01-23 RX ORDER — HYDROCHLOROTHIAZIDE 25 MG/1
25 TABLET ORAL
Qty: 90 | Refills: 0 | Status: ACTIVE | COMMUNITY
Start: 2025-01-16 | End: 1900-01-01

## 2025-02-21 ENCOUNTER — APPOINTMENT (OUTPATIENT)
Dept: UROLOGY | Facility: CLINIC | Age: 70
End: 2025-02-21
Payer: MEDICARE

## 2025-02-21 ENCOUNTER — NON-APPOINTMENT (OUTPATIENT)
Age: 70
End: 2025-02-21

## 2025-02-21 VITALS
SYSTOLIC BLOOD PRESSURE: 155 MMHG | HEART RATE: 68 BPM | BODY MASS INDEX: 41.65 KG/M2 | TEMPERATURE: 97.8 F | WEIGHT: 250 LBS | HEIGHT: 65 IN | DIASTOLIC BLOOD PRESSURE: 81 MMHG | OXYGEN SATURATION: 98 %

## 2025-02-21 PROCEDURE — 99213 OFFICE O/P EST LOW 20 MIN: CPT

## 2025-02-21 PROCEDURE — G2211 COMPLEX E/M VISIT ADD ON: CPT

## 2025-02-24 ENCOUNTER — APPOINTMENT (OUTPATIENT)
Dept: NEPHROLOGY | Facility: CLINIC | Age: 70
End: 2025-02-24

## 2025-04-14 ENCOUNTER — NON-APPOINTMENT (OUTPATIENT)
Age: 70
End: 2025-04-14

## 2025-04-18 ENCOUNTER — NON-APPOINTMENT (OUTPATIENT)
Age: 70
End: 2025-04-18

## 2025-04-18 LAB — BACTERIA UR CULT: ABNORMAL

## 2025-04-23 ENCOUNTER — APPOINTMENT (OUTPATIENT)
Dept: UROLOGY | Facility: CLINIC | Age: 70
End: 2025-04-23
Payer: MEDICARE

## 2025-04-23 PROCEDURE — G2211 COMPLEX E/M VISIT ADD ON: CPT | Mod: 2W

## 2025-04-23 PROCEDURE — 99214 OFFICE O/P EST MOD 30 MIN: CPT | Mod: 2W

## 2025-04-23 RX ORDER — CEFPODOXIME PROXETIL 100 MG/1
100 TABLET, FILM COATED ORAL
Qty: 14 | Refills: 3 | Status: ACTIVE | COMMUNITY
Start: 2025-04-14 | End: 1900-01-01

## 2025-05-14 ENCOUNTER — APPOINTMENT (OUTPATIENT)
Dept: UROLOGY | Facility: CLINIC | Age: 70
End: 2025-05-14
Payer: MEDICARE

## 2025-05-14 PROCEDURE — G2211 COMPLEX E/M VISIT ADD ON: CPT | Mod: 2W

## 2025-05-14 PROCEDURE — 99212 OFFICE O/P EST SF 10 MIN: CPT | Mod: 2W

## 2025-07-28 ENCOUNTER — LABORATORY RESULT (OUTPATIENT)
Age: 70
End: 2025-07-28

## 2025-07-28 ENCOUNTER — APPOINTMENT (OUTPATIENT)
Dept: NEPHROLOGY | Facility: CLINIC | Age: 70
End: 2025-07-28
Payer: MEDICARE

## 2025-07-28 VITALS — SYSTOLIC BLOOD PRESSURE: 156 MMHG | DIASTOLIC BLOOD PRESSURE: 89 MMHG | HEART RATE: 72 BPM

## 2025-07-28 VITALS — BODY MASS INDEX: 43.77 KG/M2 | WEIGHT: 263 LBS

## 2025-07-28 DIAGNOSIS — E78.5 HYPERLIPIDEMIA, UNSPECIFIED: ICD-10-CM

## 2025-07-28 DIAGNOSIS — N18.2 CHRONIC KIDNEY DISEASE, STAGE 2 (MILD): ICD-10-CM

## 2025-07-28 PROCEDURE — G2211 COMPLEX E/M VISIT ADD ON: CPT

## 2025-07-28 PROCEDURE — 99214 OFFICE O/P EST MOD 30 MIN: CPT

## 2025-07-28 PROCEDURE — 36415 COLL VENOUS BLD VENIPUNCTURE: CPT

## 2025-07-29 LAB
25(OH)D3 SERPL-MCNC: 16 NG/ML
ALBUMIN SERPL ELPH-MCNC: 4.4 G/DL
ALP BLD-CCNC: 90 U/L
ALT SERPL-CCNC: 19 U/L
ANION GAP SERPL CALC-SCNC: 18 MMOL/L
APPEARANCE: CLEAR
AST SERPL-CCNC: 19 U/L
BACTERIA: ABNORMAL /HPF
BILIRUB SERPL-MCNC: 0.4 MG/DL
BILIRUBIN URINE: NEGATIVE
BLOOD URINE: ABNORMAL
BUN SERPL-MCNC: 15 MG/DL
CALCIUM SERPL-MCNC: 9.1 MG/DL
CALCIUM SERPL-MCNC: 9.1 MG/DL
CAST: 0 /LPF
CHLORIDE SERPL-SCNC: 106 MMOL/L
CHOLEST SERPL-MCNC: 116 MG/DL
CO2 SERPL-SCNC: 17 MMOL/L
COLOR: YELLOW
CREAT SERPL-MCNC: 1.22 MG/DL
CREAT SPEC-SCNC: 125 MG/DL
CREAT/PROT UR: 0.2 RATIO
CRP SERPL-MCNC: 3 MG/L
CYSTATIN C SERPL-MCNC: 1.41 MG/L
EGFRCR SERPLBLD CKD-EPI 2021: 65 ML/MIN/1.73M2
EPITHELIAL CELLS: 0 /HPF
ERYTHROCYTE [SEDIMENTATION RATE] IN BLOOD BY WESTERGREN METHOD: 10 MM/HR
ESTIMATED AVERAGE GLUCOSE: 123 MG/DL
FERRITIN SERPL-MCNC: 181 NG/ML
FOLATE SERPL-MCNC: 5.6 NG/ML
GFR/BSA.PRED SERPLBLD CYS-BASED-ARV: 48 ML/MIN/1.73M2
GLUCOSE QUALITATIVE U: NEGATIVE MG/DL
GLUCOSE SERPL-MCNC: 92 MG/DL
HBA1C MFR BLD HPLC: 5.9 %
HCT VFR BLD CALC: 42.5 %
HCYS SERPL-MCNC: 15.3 UMOL/L
HDLC SERPL-MCNC: 35 MG/DL
HGB BLD-MCNC: 13.7 G/DL
IRON SATN MFR SERPL: 22 %
IRON SERPL-MCNC: 60 UG/DL
KETONES URINE: NEGATIVE MG/DL
LDLC SERPL-MCNC: 38 MG/DL
LEUKOCYTE ESTERASE URINE: ABNORMAL
MAGNESIUM SERPL-MCNC: 1.9 MG/DL
MCHC RBC-ENTMCNC: 29.5 PG
MCHC RBC-ENTMCNC: 32.2 G/DL
MCV RBC AUTO: 91.4 FL
MICROSCOPIC-UA: NORMAL
NITRITE URINE: POSITIVE
NONHDLC SERPL-MCNC: 81 MG/DL
PARATHYROID HORMONE INTACT: 75 PG/ML
PH URINE: 5.5
PHOSPHATE SERPL-MCNC: 3 MG/DL
PLATELET # BLD AUTO: 121 K/UL
POTASSIUM SERPL-SCNC: 3.9 MMOL/L
PROT SERPL-MCNC: 7.1 G/DL
PROT UR-MCNC: 26 MG/DL
PROTEIN URINE: 30 MG/DL
PSA FREE FLD-MCNC: 26 %
PSA FREE SERPL-MCNC: 0.47 NG/ML
PSA SERPL-MCNC: 1.79 NG/ML
RBC # BLD: 4.65 M/UL
RBC # FLD: 14.7 %
RED BLOOD CELLS URINE: 0 /HPF
SODIUM SERPL-SCNC: 141 MMOL/L
SPECIFIC GRAVITY URINE: 1.02
T3FREE SERPL-MCNC: 2.97 PG/ML
T3RU NFR SERPL: 1.1 TBI
T4 FREE SERPL-MCNC: 1 NG/DL
T4 SERPL-MCNC: 5.1 UG/DL
THYROGLOB AB SERPL-ACNC: 61.1 IU/ML
THYROPEROXIDASE AB SERPL IA-ACNC: 12.8 IU/ML
TIBC SERPL-MCNC: 272 UG/DL
TRIGL SERPL-MCNC: 285 MG/DL
TSH SERPL-ACNC: 3.54 UIU/ML
UIBC SERPL-MCNC: 212 UG/DL
URATE SERPL-MCNC: 5 MG/DL
UROBILINOGEN URINE: 0.2 MG/DL
VIT B12 SERPL-MCNC: 323 PG/ML
WBC # FLD AUTO: 8.45 K/UL
WHITE BLOOD CELLS URINE: 200 /HPF

## 2025-07-30 VITALS — SYSTOLIC BLOOD PRESSURE: 150 MMHG | DIASTOLIC BLOOD PRESSURE: 86 MMHG

## 2025-07-30 LAB
24R-OH-CALCIDIOL SERPL-MCNC: 46.2 PG/ML
ALBUMIN MFR SERPL ELPH: 59.9 %
ALBUMIN SERPL-MCNC: 4.3 G/DL
ALBUMIN/GLOB SERPL: 1.5 RATIO
ALPHA1 GLOB MFR SERPL ELPH: 3.6 %
ALPHA1 GLOB SERPL ELPH-MCNC: 0.3 G/DL
ALPHA2 GLOB MFR SERPL ELPH: 9.1 %
ALPHA2 GLOB SERPL ELPH-MCNC: 0.6 G/DL
B-GLOBULIN MFR SERPL ELPH: 9.6 %
B-GLOBULIN SERPL ELPH-MCNC: 0.7 G/DL
DEPRECATED KAPPA LC FREE/LAMBDA SER: 1.05 RATIO
GAMMA GLOB FLD ELPH-MCNC: 1.3 G/DL
GAMMA GLOB MFR SERPL ELPH: 17.8 %
IGA SERPL-MCNC: 84 MG/DL
IGG SERPL-MCNC: 1181 MG/DL
IGM SERPL-MCNC: 146 MG/DL
INTERPRETATION SERPL IEP-IMP: NORMAL
KAPPA LC CSF-MCNC: 1.84 MG/DL
KAPPA LC SERPL-MCNC: 1.93 MG/DL
M PROTEIN SPEC IFE-MCNC: NORMAL
PROT SERPL-MCNC: 7.1 G/DL
PROT SERPL-MCNC: 7.1 G/DL

## 2025-07-31 LAB — BACTERIA UR CULT: ABNORMAL

## 2025-08-01 LAB
ALP BONE SERPL-MCNC: 14.8 UG/L
METHYLMALONATE SERPL-SCNC: 237 NMOL/L

## 2025-08-05 LAB — COLLAGEN CTX SERPL-MCNC: 218 PG/ML

## 2025-08-18 ENCOUNTER — APPOINTMENT (OUTPATIENT)
Dept: NEPHROLOGY | Facility: CLINIC | Age: 70
End: 2025-08-18

## 2025-09-05 ENCOUNTER — APPOINTMENT (OUTPATIENT)
Dept: UROLOGY | Facility: CLINIC | Age: 70
End: 2025-09-05
Payer: MEDICARE

## 2025-09-05 PROCEDURE — G2211 COMPLEX E/M VISIT ADD ON: CPT

## 2025-09-05 PROCEDURE — 99213 OFFICE O/P EST LOW 20 MIN: CPT

## 2025-09-05 PROCEDURE — 76775 US EXAM ABDO BACK WALL LIM: CPT
